# Patient Record
Sex: FEMALE | Race: WHITE | NOT HISPANIC OR LATINO
[De-identification: names, ages, dates, MRNs, and addresses within clinical notes are randomized per-mention and may not be internally consistent; named-entity substitution may affect disease eponyms.]

---

## 2017-01-02 ENCOUNTER — HOSPITAL (OUTPATIENT)
Dept: OTHER | Age: 57
End: 2017-01-02
Attending: EMERGENCY MEDICINE

## 2017-02-10 PROCEDURE — 87269 GIARDIA AG IF: CPT | Performed by: INTERNAL MEDICINE

## 2017-02-10 PROCEDURE — 87045 FECES CULTURE AEROBIC BACT: CPT | Performed by: INTERNAL MEDICINE

## 2017-02-10 PROCEDURE — 87046 STOOL CULTR AEROBIC BACT EA: CPT | Performed by: INTERNAL MEDICINE

## 2017-02-11 PROCEDURE — 89055 LEUKOCYTE ASSESSMENT FECAL: CPT | Performed by: INTERNAL MEDICINE

## 2017-02-11 PROCEDURE — 87015 SPECIMEN INFECT AGNT CONCNTJ: CPT | Performed by: INTERNAL MEDICINE

## 2017-02-11 PROCEDURE — 36415 COLL VENOUS BLD VENIPUNCTURE: CPT | Performed by: INTERNAL MEDICINE

## 2017-02-11 PROCEDURE — 87207 SMEAR SPECIAL STAIN: CPT | Performed by: INTERNAL MEDICINE

## 2017-02-11 PROCEDURE — 87493 C DIFF AMPLIFIED PROBE: CPT | Performed by: INTERNAL MEDICINE

## 2017-02-11 PROCEDURE — 82705 FATS/LIPIDS FECES QUAL: CPT | Performed by: INTERNAL MEDICINE

## 2017-02-15 PROBLEM — Z79.899 ENCOUNTER FOR LONG-TERM (CURRENT) DRUG USE: Status: ACTIVE | Noted: 2017-02-15

## 2017-02-28 PROBLEM — R06.02 SOB (SHORTNESS OF BREATH): Status: ACTIVE | Noted: 2017-02-28

## 2017-02-28 PROBLEM — J45.21 MILD INTERMITTENT ASTHMA WITH ACUTE EXACERBATION: Status: ACTIVE | Noted: 2017-02-28

## 2017-03-01 PROCEDURE — 36415 COLL VENOUS BLD VENIPUNCTURE: CPT | Performed by: INTERNAL MEDICINE

## 2017-03-01 PROCEDURE — 82784 ASSAY IGA/IGD/IGG/IGM EACH: CPT | Performed by: INTERNAL MEDICINE

## 2017-03-01 PROCEDURE — 83516 IMMUNOASSAY NONANTIBODY: CPT | Performed by: INTERNAL MEDICINE

## 2017-03-03 PROCEDURE — 88305 TISSUE EXAM BY PATHOLOGIST: CPT | Performed by: INTERNAL MEDICINE

## 2017-05-17 PROBLEM — Z79.899 ENCOUNTER FOR LONG-TERM (CURRENT) DRUG USE: Status: ACTIVE | Noted: 2017-05-17

## 2017-05-17 PROCEDURE — 86038 ANTINUCLEAR ANTIBODIES: CPT | Performed by: INTERNAL MEDICINE

## 2017-05-17 PROCEDURE — 86480 TB TEST CELL IMMUN MEASURE: CPT | Performed by: INTERNAL MEDICINE

## 2018-03-23 PROBLEM — E55.9 VITAMIN D DEFICIENCY: Status: ACTIVE | Noted: 2018-03-23

## 2018-03-23 PROCEDURE — 86480 TB TEST CELL IMMUN MEASURE: CPT | Performed by: INTERNAL MEDICINE

## 2019-02-14 PROCEDURE — 86480 TB TEST CELL IMMUN MEASURE: CPT | Performed by: INTERNAL MEDICINE

## 2019-10-21 ENCOUNTER — HOSPITAL (OUTPATIENT)
Dept: OTHER | Age: 59
End: 2019-10-21
Attending: INTERNAL MEDICINE

## 2019-11-19 ENCOUNTER — OFFICE VISIT (OUTPATIENT)
Dept: SURGERY | Facility: CLINIC | Age: 59
End: 2019-11-19
Payer: COMMERCIAL

## 2019-11-19 VITALS
DIASTOLIC BLOOD PRESSURE: 72 MMHG | SYSTOLIC BLOOD PRESSURE: 126 MMHG | HEIGHT: 58 IN | BODY MASS INDEX: 39.04 KG/M2 | WEIGHT: 186 LBS | HEART RATE: 76 BPM

## 2019-11-19 DIAGNOSIS — M47.816 LUMBAR SPONDYLOSIS: ICD-10-CM

## 2019-11-19 DIAGNOSIS — M48.02 CERVICAL STENOSIS OF SPINAL CANAL: ICD-10-CM

## 2019-11-19 DIAGNOSIS — R41.89 COGNITIVE CHANGE: ICD-10-CM

## 2019-11-19 DIAGNOSIS — R26.9 GAIT ABNORMALITY: ICD-10-CM

## 2019-11-19 DIAGNOSIS — R42 VERTIGO: ICD-10-CM

## 2019-11-19 DIAGNOSIS — R42 DIZZINESS: ICD-10-CM

## 2019-11-19 DIAGNOSIS — R29.898 WEAKNESS OF BOTH LEGS: ICD-10-CM

## 2019-11-19 DIAGNOSIS — G93.5 CHIARI I MALFORMATION (HCC): Primary | ICD-10-CM

## 2019-11-19 PROCEDURE — 99204 OFFICE O/P NEW MOD 45 MIN: CPT | Performed by: PHYSICIAN ASSISTANT

## 2019-11-19 RX ORDER — BUTALBITAL, ACETAMINOPHEN AND CAFFEINE 50; 325; 40 MG/1; MG/1; MG/1
1 TABLET ORAL EVERY 4 HOURS PRN
Qty: 30 TABLET | Refills: 0 | Status: SHIPPED | OUTPATIENT
Start: 2019-11-19 | End: 2020-06-11 | Stop reason: ALTCHOICE

## 2019-11-19 RX ORDER — PREDNISONE 10 MG/1
10 TABLET ORAL DAILY
Qty: 30 TABLET | Refills: 0 | Status: SHIPPED | OUTPATIENT
Start: 2019-11-19 | End: 2020-03-06 | Stop reason: ALTCHOICE

## 2019-11-19 RX ORDER — DIAZEPAM 10 MG/1
10 TABLET ORAL 3 TIMES DAILY PRN
Qty: 3 TABLET | Refills: 0 | Status: SHIPPED | OUTPATIENT
Start: 2019-11-19 | End: 2020-01-02

## 2019-11-19 NOTE — PROGRESS NOTES
Neurosurgery Consultation      REASON FOR CONSULT: Chiari malformation    HISTORY OF PRESENT Freya Valdivia is a 60year old RH female here for neurosurgical consultation for recently diagnosed Chiari malformation.   States in April and May of t lesser toe 9/9/2014   • Vitamin D deficiency        PAST SURGICAL HISTORY:  Past Surgical History:   Procedure Laterality Date   • COLONOSCOPY, POSSIBLE BIOPSY, POSSIBLE POLYPECTOMY 14272 N/A 3/3/2017    Performed by Boogie Dillon MD at [address] [place] [address] Aero Soln, Inhale 2 puffs into the lungs every 6 (six) hours as needed for Wheezing., Disp: 1 Inhaler, Rfl: 0  Lifitegrast (Marcus MCLEAN), Apply to eye., Disp: , Rfl:     No current facility-administered medications on file prior to visit.        REVIEW OF SYS Chiari malformation with 13 mm tonsillar extension. She has cervical spondylosis at C4-5 C5-6 and C6-7    ASSESSMENT:  1. Chiari malformation  2. Cervical stenosis C4-5 C5-6 C6-7  3. Cognitive changes  4. Gait dysfunction  5.   Rheumatoid arthritis

## 2019-11-19 NOTE — PROGRESS NOTES
Location of Pain:  Pt states that she has issues with cervical pain and head pain. Pt states that she has notice when symptome's occur more when she is laying down flat and issues with yelling or bending over.      Date Pain Began:   April 2019       Work R

## 2019-11-19 NOTE — PATIENT INSTRUCTIONS
Refill policies:    • Allow 2-3 business days for refills; controlled substances may take longer.   • Contact your pharmacy at least 5 days prior to running out of medication and have them send an electronic request or submit request through the “request re Depending on your insurance carrier, approval may take 3-10 days. It is highly recommended patients contact their insurance carrier directly to determine coverage.   If test is done without insurance authorization, patient may be responsible for the entire headaches, prednisone taper if she has an acute episode  3. Imaging: MRI the brain, MRI of the thoracic spine, MRI of the lumbar spine  4.  Activity: As tolerated

## 2019-11-22 ENCOUNTER — TELEPHONE (OUTPATIENT)
Dept: PAIN CLINIC | Facility: CLINIC | Age: 59
End: 2019-11-22

## 2019-11-22 NOTE — TELEPHONE ENCOUNTER
LVMTCB  (No identifier)    Need more information regarding ;letter for work with restrictions.   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    What exactly does she need the letter to state

## 2019-11-22 NOTE — TELEPHONE ENCOUNTER
Pt requesting letter for employer giving her restrictions, pt states she's tired and foggy; pt requesting letter be sent to [employer]

## 2019-11-22 NOTE — TELEPHONE ENCOUNTER
Patient called back with detailed information regarding work restrictions letter. Patient stated:    She has days when she has dizziness at work, pain during her work day, and shaking hands.   Patient states fatigue and exhaustion follow these symptoms, an

## 2019-11-25 NOTE — TELEPHONE ENCOUNTER
Sent my chart message:  \"I cannot provide you with a letter stating that you can only work the hours you feel you can work. I can fill out FMLA for intermittent leave if you need to take time off. I can write you letter restricting your work.  Let me know\

## 2019-12-03 ENCOUNTER — PATIENT MESSAGE (OUTPATIENT)
Dept: SURGERY | Facility: CLINIC | Age: 59
End: 2019-12-03

## 2019-12-03 ENCOUNTER — TELEPHONE (OUTPATIENT)
Dept: SURGERY | Facility: CLINIC | Age: 59
End: 2019-12-03

## 2019-12-03 NOTE — TELEPHONE ENCOUNTER
Pt calling requesting call back @ her work # [phone]. Pt states she wants to discuss the letter, \"because it would be really nice to stay employed so I can pay for this surgery\".

## 2019-12-03 NOTE — TELEPHONE ENCOUNTER
Called patient at her job as she requested. She began to raise her voice and speaking over me regarding the [employer] message sent back to her regarding her [place].   She stated she will lose her job if the paperwork is not filled out, she works for

## 2019-12-03 NOTE — TELEPHONE ENCOUNTER
ALVIN Velasquez paperwork was completed. She was given intermittent leave with frequency of 1 time a week and may last 1 or 2 days per episode. Please return to [facility] and a copy to the patient.     Please scan a copy of the document

## 2019-12-03 NOTE — TELEPHONE ENCOUNTER
Patient requested letter in November that she could take time off of work when she needs to with no specific dates. This was denied and the patient was informed.     [place] paperwork was received but will not be completed until her follow-up appointment moustapha encarnacion

## 2019-12-03 NOTE — TELEPHONE ENCOUNTER
[facility] [place] forms to be completed. Endorsed to Wade.

## 2019-12-03 NOTE — TELEPHONE ENCOUNTER
From: Shalonda King  To: Geoff Ramos  Sent: 12/3/2019 1:45 PM CST  Subject: Sathya Galvin,    We received [facility] paperwork today and this was reviewed with ALLY Chong who you saw in the office on 11/19/19.      At that office visit in

## 2019-12-03 NOTE — TELEPHONE ENCOUNTER
Patient was seen once on 11/19/19. No mention of being taken off work. Paperwork forwarded to ALLY Kamara to advise on paperwork.

## 2019-12-04 NOTE — TELEPHONE ENCOUNTER
[facility] paperwork faxed to [address] at fax number:    [phone]    Forms copied and mailed to patient's home. Scanning sheet printed and paperwork sent to front folder for scanning.

## 2019-12-09 NOTE — TELEPHONE ENCOUNTER
Pt called stating employer needs \"numeric response for frequency & duration; number of hours and numbers of days \" on question #7; pt aware form en route to scanning dept; pt will re-fax form to MICHELLE, pt willing to pay additional charge if needed

## 2019-12-10 NOTE — TELEPHONE ENCOUNTER
Received FMLA forms from [address] [place] [address]  \"FMLA forms partially completed. \" Endorsed to provider

## 2019-12-10 NOTE — TELEPHONE ENCOUNTER
Veronica Caller forms re-received, endorsed to provider for review of question #7; please advise if pt should be charged for this in addition to original form

## 2019-12-10 NOTE — TELEPHONE ENCOUNTER
Pt calling, states she got the original in mail, she will refax that one back to us since we can't see the scanning one yet. She states that not only does question #7 need to be changed but page 4 is a signature page & it needs to be signed.  Please call pa

## 2019-12-12 NOTE — TELEPHONE ENCOUNTER
[place] paperwork was reviewed again.     Item #7 additional information was provided including her next appointment date and initialed    Form was signed for resubmission

## 2019-12-12 NOTE — TELEPHONE ENCOUNTER
Revised documents (revised per ALLY Chong) sent to scanning and faxed to LEOBARDO [facility] at fax number: [phone]    Patient messaged and informed of paperwork transmission.       Newly revised disability forms sent

## 2020-01-02 ENCOUNTER — OFFICE VISIT (OUTPATIENT)
Dept: SURGERY | Facility: CLINIC | Age: 60
End: 2020-01-02
Payer: COMMERCIAL

## 2020-01-02 ENCOUNTER — HOSPITAL ENCOUNTER (OUTPATIENT)
Dept: GENERAL RADIOLOGY | Facility: HOSPITAL | Age: 60
Discharge: HOME | End: 2020-01-02
Attending: PHYSICIAN ASSISTANT
Payer: COMMERCIAL

## 2020-01-02 VITALS — DIASTOLIC BLOOD PRESSURE: 76 MMHG | SYSTOLIC BLOOD PRESSURE: 130 MMHG | HEART RATE: 76 BPM

## 2020-01-02 DIAGNOSIS — G93.5 CHIARI I MALFORMATION (HCC): Primary | ICD-10-CM

## 2020-01-02 DIAGNOSIS — R26.9 GAIT ABNORMALITY: ICD-10-CM

## 2020-01-02 DIAGNOSIS — R42 DIZZINESS: ICD-10-CM

## 2020-01-02 DIAGNOSIS — R41.89 COGNITIVE CHANGE: ICD-10-CM

## 2020-01-02 DIAGNOSIS — M48.02 CERVICAL STENOSIS OF SPINAL CANAL: ICD-10-CM

## 2020-01-02 DIAGNOSIS — R42 VERTIGO: ICD-10-CM

## 2020-01-02 DIAGNOSIS — M47.816 LUMBAR SPONDYLOSIS: ICD-10-CM

## 2020-01-02 DIAGNOSIS — R29.898 WEAKNESS OF BOTH LEGS: ICD-10-CM

## 2020-01-02 PROCEDURE — 72052 X-RAY EXAM NECK SPINE 6/>VWS: CPT | Performed by: PHYSICIAN ASSISTANT

## 2020-01-02 PROCEDURE — 99214 OFFICE O/P EST MOD 30 MIN: CPT | Performed by: PHYSICIAN ASSISTANT

## 2020-01-02 RX ORDER — DIAZEPAM 10 MG/1
10 TABLET ORAL ONCE
Qty: 1 TABLET | Refills: 0 | Status: SHIPPED | OUTPATIENT
Start: 2020-01-02 | End: 2020-01-02

## 2020-01-02 NOTE — PROGRESS NOTES
Neurosurgery  Follow up      [address] is a 60year old RH female here in follow up after imaging. C/O posterior head pressure lying down. Choking. C/O SOB and tightness.  Bent over in April to  dog lease and sy She denies any night sweats night pain.     PAST MEDICAL HISTORY:  Past Medical History:   Diagnosis Date   • Chronic rhinitis    • Collagenous colitis    • Extrinsic asthma, unspecified    • GERD (gastroesophageal reflux disease)    • Low back pain    • Pl 3 (three) times daily as needed. Before MRIs, Disp: 3 tablet, Rfl: 0  predniSONE 10 MG Oral Tab, Take 1 tablet (10 mg total) by mouth daily.  40 mg per day x 4 days, 30 mg per day x 3 days, 20 mg per day x 2 days, 10 mg x 1 day, Disp: 30 tablet, Rfl: 0  But intact. No clonus. Patient can heel and toe walk. Negative Spurling's. negative Regalado's. Nearly positive Romberg. With bilateral straight leg raising she gets pressure in her neck. Coughing and sneezing gives her increased pain. No scoliosis.   No de arthritis  6. L4-5 grade I listhesis and L3-4-5 spinal stenosis    PLAN:  1. Follow up after imaging  2. Medication: valium ordered for MRI  3. Imaging: MRI brain w/wo r/o underlying lesion vs isolated Chiari, XR CS r/o C1-2 instability  4.  Activity: As to

## 2020-01-02 NOTE — PROGRESS NOTES
Pt is here for follow up to review imaging     Imaging:     MRI of the thoracic     MRI of the lumbar     MRI of the brain. Pt states that she has been issues with numbness and tingling in the bilateral arm.  Pt states that she has issues with weakness

## 2020-01-02 NOTE — PATIENT INSTRUCTIONS
Refill policies:    • Allow 2-3 business days for refills; controlled substances may take longer.   • Contact your pharmacy at least 5 days prior to running out of medication and have them send an electronic request or submit request through the “request re Depending on your insurance carrier, approval may take 3-10 days. It is highly recommended patients contact their insurance carrier directly to determine coverage.   If test is done without insurance authorization, patient may be responsible for the entire underlying lesion vs isolated Chiari, XR CS r/o C1-2 instability  4. Activity: As tolerated  5. Consider Chiari decompression if MRI brain 3T w/wo negative for additional pathology  6.  She can call to schedule surgery

## 2020-01-13 ENCOUNTER — HOSPITAL ENCOUNTER (OUTPATIENT)
Dept: MRI IMAGING | Facility: HOSPITAL | Age: 60
Discharge: HOME | End: 2020-01-13
Attending: PHYSICIAN ASSISTANT
Payer: COMMERCIAL

## 2020-01-13 DIAGNOSIS — R42 DIZZINESS: ICD-10-CM

## 2020-01-13 DIAGNOSIS — R29.898 WEAKNESS OF BOTH LEGS: ICD-10-CM

## 2020-01-13 DIAGNOSIS — G93.5 CHIARI I MALFORMATION (HCC): ICD-10-CM

## 2020-01-13 DIAGNOSIS — R26.9 GAIT ABNORMALITY: ICD-10-CM

## 2020-01-13 DIAGNOSIS — R41.89 COGNITIVE CHANGE: ICD-10-CM

## 2020-01-13 DIAGNOSIS — R42 VERTIGO: ICD-10-CM

## 2020-01-13 PROCEDURE — 70553 MRI BRAIN STEM W/O & W/DYE: CPT | Performed by: PHYSICIAN ASSISTANT

## 2020-01-13 PROCEDURE — A9575 INJ GADOTERATE MEGLUMI 0.1ML: HCPCS | Performed by: PHYSICIAN ASSISTANT

## 2020-01-16 ENCOUNTER — PATIENT MESSAGE (OUTPATIENT)
Dept: SURGERY | Facility: CLINIC | Age: 60
End: 2020-01-16

## 2020-01-17 ENCOUNTER — TELEPHONE (OUTPATIENT)
Dept: SURGERY | Facility: CLINIC | Age: 60
End: 2020-01-17

## 2020-01-17 DIAGNOSIS — R42 DIZZINESS: ICD-10-CM

## 2020-01-17 DIAGNOSIS — M47.816 LUMBAR SPONDYLOSIS: ICD-10-CM

## 2020-01-17 DIAGNOSIS — R42 VERTIGO: ICD-10-CM

## 2020-01-17 DIAGNOSIS — R41.89 COGNITIVE CHANGE: ICD-10-CM

## 2020-01-17 DIAGNOSIS — R29.898 WEAKNESS OF BOTH LEGS: ICD-10-CM

## 2020-01-17 DIAGNOSIS — R26.9 GAIT ABNORMALITY: ICD-10-CM

## 2020-01-17 DIAGNOSIS — M48.02 CERVICAL STENOSIS OF SPINAL CANAL: ICD-10-CM

## 2020-01-17 DIAGNOSIS — G93.5 CHIARI I MALFORMATION (HCC): Primary | ICD-10-CM

## 2020-01-17 NOTE — TELEPHONE ENCOUNTER
From: Renea Omer  To: Trent Lu MD  Sent: 1/16/2020 8:37 PM CST  Subject: Test Results Question    Dr. Micky Hudson,    The MRI you ordered was conducted on the 13th.  I had set a follow-up appt before realizing you said I would not need to come in

## 2020-01-17 NOTE — TELEPHONE ENCOUNTER
Per Radiology:    Dictated by: Beto Buckley MD on 1/13/2020 at 15:23  Impression:         \"CONCLUSION:       1.  Chiari 1 malformation is noted. Milta Child is 8 mm herniation of the cerebellar tonsil below the foramen magnum.  There is beaking of the cer

## 2020-01-17 NOTE — TELEPHONE ENCOUNTER
X-rays of the cervical spine were negative for instability at C1-2. Repeat MRI of the brain on a high-field magnet with contrast did not show any underlying malignancy or lesions.   Orders were placed for a Chiari decompression with Dr. Joseph Raya

## 2020-01-20 NOTE — TELEPHONE ENCOUNTER
Please call patient to schedule two post op appointments with Dr. Evi Montes for 2 and 6 weeks. Surgery scheduled for 03/18/20.   Thank you

## 2020-01-20 NOTE — TELEPHONE ENCOUNTER
You are scheduled for Chiari Decompression surgery on 03/18/20 with Kalli Mcintyre    Pre-op instructions discussed with patient and surgical packet provided:    · You will need to contact the Pre-admission department at [phone].  You will speak with a nurse after this type of surgery due to cutting of muscles and reattachment. Patients tend to experience a lot of muscle spasm pain and your neck movement is very restricted post surgery. This may last a couple of days to a couple of  weeks.  You will be prescrib

## 2020-02-20 PROBLEM — J45.21 MILD INTERMITTENT ASTHMA WITH ACUTE EXACERBATION: Status: RESOLVED | Noted: 2017-02-28 | Resolved: 2020-02-20

## 2020-02-24 NOTE — TELEPHONE ENCOUNTER
PCP recommending cardiology evaluation prior to clearance. Cardiology appointment scheduled for 2/27. Breath sounds clear and equal bilaterally.

## 2020-02-25 NOTE — TELEPHONE ENCOUNTER
Prior authorization request completed for: CRANIECTOMY FOR CHIARI MALFORMATION DECOMPRESSION.  DUROPLASTY WITH GRAFTING   Authorization #[id number]  Authorization dates: 03/18/2020-03/20/2020  CPT codes approved: 01824, 50980  Physician: Dr. Yunior Salazar

## 2020-02-26 NOTE — TELEPHONE ENCOUNTER
Patient has seen PCP 2/20 with notes:     \"Rheumatoid arthritis involving multiple sites with positive rheumatoid factor (hcc): stable     Advise pt to f/u with cardiology for cardiac risk stratification.       Orders Placed This Encounter      MATTHEW Giang

## 2020-03-02 NOTE — TELEPHONE ENCOUNTER
Patient has been cleared by cardiology: \"1) No cardiac contraindications to proceeding with her neurosurgical procedure. 2) She will work on increasing physical activity and weight loss after the procedure.  \"

## 2020-03-05 ENCOUNTER — TELEPHONE (OUTPATIENT)
Dept: SURGERY | Facility: CLINIC | Age: 60
End: 2020-03-05

## 2020-03-06 RX ORDER — GARLIC EXTRACT 500 MG
1 CAPSULE ORAL DAILY
COMMUNITY
End: 2020-06-11 | Stop reason: ALTCHOICE

## 2020-03-06 RX ORDER — MELATONIN
1000 DAILY
COMMUNITY
End: 2020-06-11 | Stop reason: ALTCHOICE

## 2020-03-11 NOTE — TELEPHONE ENCOUNTER
Spoke to Miko Casanova at [facility], inpatient surgical authorization has been updated to reflect new date of service:    Prior authorization request completed for: CRANIECTOMY FOR CHIARI MALFORMATION DECOMPRESSION.  DUROPLASTY WITH GRAFTING   Authorization #[id number]

## 2020-03-11 NOTE — TELEPHONE ENCOUNTER
Called patient on behalf of Dr. Jayjay Whitehead to move surgery date to 03/16 at 12:00. Patient accepted.

## 2020-03-16 ENCOUNTER — HOSPITAL ENCOUNTER (INPATIENT)
Facility: HOSPITAL | Age: 60
LOS: 3 days | Discharge: NURSING, REHAB OR LONG-TERM CARE | DRG: 026 | End: 2020-03-19
Attending: NEUROLOGICAL SURGERY | Admitting: NEUROLOGICAL SURGERY
Payer: COMMERCIAL

## 2020-03-16 ENCOUNTER — ANESTHESIA EVENT (OUTPATIENT)
Dept: SURGERY | Facility: HOSPITAL | Age: 60
DRG: 026 | End: 2020-03-16
Payer: COMMERCIAL

## 2020-03-16 ENCOUNTER — ANESTHESIA (OUTPATIENT)
Dept: SURGERY | Facility: HOSPITAL | Age: 60
DRG: 026 | End: 2020-03-16
Payer: COMMERCIAL

## 2020-03-16 ENCOUNTER — APPOINTMENT (OUTPATIENT)
Dept: CT IMAGING | Facility: HOSPITAL | Age: 60
DRG: 026 | End: 2020-03-16
Attending: PHYSICIAN ASSISTANT
Payer: COMMERCIAL

## 2020-03-16 DIAGNOSIS — R41.89 COGNITIVE CHANGE: ICD-10-CM

## 2020-03-16 DIAGNOSIS — M47.816 LUMBAR SPONDYLOSIS: ICD-10-CM

## 2020-03-16 DIAGNOSIS — G93.5 CHIARI I MALFORMATION (HCC): ICD-10-CM

## 2020-03-16 DIAGNOSIS — M48.02 CERVICAL STENOSIS OF SPINAL CANAL: ICD-10-CM

## 2020-03-16 DIAGNOSIS — R26.9 GAIT ABNORMALITY: ICD-10-CM

## 2020-03-16 DIAGNOSIS — R29.898 WEAKNESS OF BOTH LEGS: ICD-10-CM

## 2020-03-16 DIAGNOSIS — R42 VERTIGO: ICD-10-CM

## 2020-03-16 DIAGNOSIS — R42 DIZZINESS: ICD-10-CM

## 2020-03-16 PROBLEM — Z01.818 PRE-OP TESTING: Status: ACTIVE | Noted: 2020-03-16

## 2020-03-16 LAB
ANTIBODY SCREEN: NEGATIVE
DEPRECATED RDW RBC AUTO: 44.3 FL (ref 35.1–46.3)
ERYTHROCYTE [DISTWIDTH] IN BLOOD BY AUTOMATED COUNT: 13.5 % (ref 11–15)
HCT VFR BLD AUTO: 41.7 % (ref 35–48)
HGB BLD-MCNC: 13.1 G/DL (ref 12–16)
MCH RBC QN AUTO: 28.5 PG (ref 26–34)
MCHC RBC AUTO-ENTMCNC: 31.4 G/DL (ref 31–37)
MCV RBC AUTO: 90.7 FL (ref 80–100)
PLATELET # BLD AUTO: 331 10(3)UL (ref 150–450)
RBC # BLD AUTO: 4.6 X10(6)UL (ref 3.8–5.3)
RH BLOOD TYPE: POSITIVE
WBC # BLD AUTO: 14.2 X10(3) UL (ref 4–11)

## 2020-03-16 PROCEDURE — 00NC0ZZ RELEASE CEREBELLUM, OPEN APPROACH: ICD-10-PCS | Performed by: NEUROLOGICAL SURGERY

## 2020-03-16 PROCEDURE — 0NB70ZZ EXCISION OF OCCIPITAL BONE, OPEN APPROACH: ICD-10-PCS | Performed by: NEUROLOGICAL SURGERY

## 2020-03-16 PROCEDURE — 70450 CT HEAD/BRAIN W/O DYE: CPT | Performed by: PHYSICIAN ASSISTANT

## 2020-03-16 PROCEDURE — 00U20KZ SUPPLEMENT DURA MATER WITH NONAUTOLOGOUS TISSUE SUBSTITUTE, OPEN APPROACH: ICD-10-PCS | Performed by: NEUROLOGICAL SURGERY

## 2020-03-16 DEVICE — DURAL SEALANT: Type: IMPLANTABLE DEVICE | Site: HEAD | Status: FUNCTIONAL

## 2020-03-16 DEVICE — DURAMATRIX 2X2 SUTURABLE: Type: IMPLANTABLE DEVICE | Site: HEAD | Status: FUNCTIONAL

## 2020-03-16 RX ORDER — NEOSTIGMINE METHYLSULFATE 1 MG/ML
INJECTION INTRAVENOUS AS NEEDED
Status: DISCONTINUED | OUTPATIENT
Start: 2020-03-16 | End: 2020-03-16 | Stop reason: SURG

## 2020-03-16 RX ORDER — SODIUM PHOSPHATE, DIBASIC AND SODIUM PHOSPHATE, MONOBASIC 7; 19 G/133ML; G/133ML
1 ENEMA RECTAL ONCE AS NEEDED
Status: DISCONTINUED | OUTPATIENT
Start: 2020-03-16 | End: 2020-03-19

## 2020-03-16 RX ORDER — ONDANSETRON 2 MG/ML
4 INJECTION INTRAMUSCULAR; INTRAVENOUS EVERY 4 HOURS PRN
Status: DISPENSED | OUTPATIENT
Start: 2020-03-16 | End: 2020-03-17

## 2020-03-16 RX ORDER — SODIUM CHLORIDE 9 MG/ML
INJECTION, SOLUTION INTRAVENOUS CONTINUOUS PRN
Status: DISCONTINUED | OUTPATIENT
Start: 2020-03-16 | End: 2020-03-16 | Stop reason: SURG

## 2020-03-16 RX ORDER — BISACODYL 10 MG
10 SUPPOSITORY, RECTAL RECTAL
Status: DISCONTINUED | OUTPATIENT
Start: 2020-03-16 | End: 2020-03-19

## 2020-03-16 RX ORDER — DIPHENHYDRAMINE HCL 25 MG
25 CAPSULE ORAL EVERY 4 HOURS PRN
Status: DISCONTINUED | OUTPATIENT
Start: 2020-03-16 | End: 2020-03-19

## 2020-03-16 RX ORDER — ACETAMINOPHEN 325 MG/1
650 TABLET ORAL EVERY 4 HOURS PRN
Status: DISCONTINUED | OUTPATIENT
Start: 2020-03-16 | End: 2020-03-19

## 2020-03-16 RX ORDER — DIPHENHYDRAMINE HYDROCHLORIDE 50 MG/ML
25 INJECTION INTRAMUSCULAR; INTRAVENOUS EVERY 4 HOURS PRN
Status: DISCONTINUED | OUTPATIENT
Start: 2020-03-16 | End: 2020-03-19

## 2020-03-16 RX ORDER — GLYCOPYRROLATE 0.2 MG/ML
INJECTION, SOLUTION INTRAMUSCULAR; INTRAVENOUS AS NEEDED
Status: DISCONTINUED | OUTPATIENT
Start: 2020-03-16 | End: 2020-03-16 | Stop reason: SURG

## 2020-03-16 RX ORDER — METOCLOPRAMIDE HYDROCHLORIDE 5 MG/ML
INJECTION INTRAMUSCULAR; INTRAVENOUS AS NEEDED
Status: DISCONTINUED | OUTPATIENT
Start: 2020-03-16 | End: 2020-03-16 | Stop reason: SURG

## 2020-03-16 RX ORDER — METOCLOPRAMIDE HYDROCHLORIDE 5 MG/ML
10 INJECTION INTRAMUSCULAR; INTRAVENOUS EVERY 6 HOURS PRN
Status: DISCONTINUED | OUTPATIENT
Start: 2020-03-16 | End: 2020-03-19

## 2020-03-16 RX ORDER — DIAZEPAM 5 MG/1
5 TABLET ORAL EVERY 6 HOURS PRN
Status: DISCONTINUED | OUTPATIENT
Start: 2020-03-16 | End: 2020-03-19

## 2020-03-16 RX ORDER — DOCUSATE SODIUM 100 MG/1
100 CAPSULE, LIQUID FILLED ORAL 2 TIMES DAILY
Status: DISCONTINUED | OUTPATIENT
Start: 2020-03-16 | End: 2020-03-19

## 2020-03-16 RX ORDER — HYDROCODONE BITARTRATE AND ACETAMINOPHEN 10; 325 MG/1; MG/1
1 TABLET ORAL EVERY 4 HOURS PRN
Status: DISCONTINUED | OUTPATIENT
Start: 2020-03-16 | End: 2020-03-18

## 2020-03-16 RX ORDER — ROCURONIUM BROMIDE 10 MG/ML
INJECTION, SOLUTION INTRAVENOUS AS NEEDED
Status: DISCONTINUED | OUTPATIENT
Start: 2020-03-16 | End: 2020-03-16 | Stop reason: SURG

## 2020-03-16 RX ORDER — BACITRACIN 50000 [USP'U]/1
INJECTION, POWDER, LYOPHILIZED, FOR SOLUTION INTRAMUSCULAR AS NEEDED
Status: DISCONTINUED | OUTPATIENT
Start: 2020-03-16 | End: 2020-03-16 | Stop reason: HOSPADM

## 2020-03-16 RX ORDER — POLYETHYLENE GLYCOL 3350 17 G/17G
17 POWDER, FOR SOLUTION ORAL DAILY PRN
Status: DISCONTINUED | OUTPATIENT
Start: 2020-03-16 | End: 2020-03-19

## 2020-03-16 RX ORDER — SODIUM CHLORIDE 9 MG/ML
INJECTION, SOLUTION INTRAVENOUS CONTINUOUS
Status: DISCONTINUED | OUTPATIENT
Start: 2020-03-16 | End: 2020-03-16 | Stop reason: HOSPADM

## 2020-03-16 RX ORDER — ORPHENADRINE CITRATE 30 MG/ML
30 INJECTION INTRAMUSCULAR; INTRAVENOUS EVERY 6 HOURS
Status: DISCONTINUED | OUTPATIENT
Start: 2020-03-16 | End: 2020-03-18

## 2020-03-16 RX ORDER — HYDROMORPHONE HYDROCHLORIDE 1 MG/ML
0.2 INJECTION, SOLUTION INTRAMUSCULAR; INTRAVENOUS; SUBCUTANEOUS EVERY 2 HOUR PRN
Status: DISCONTINUED | OUTPATIENT
Start: 2020-03-16 | End: 2020-03-19

## 2020-03-16 RX ORDER — VANCOMYCIN HYDROCHLORIDE
15 ONCE
Status: COMPLETED | OUTPATIENT
Start: 2020-03-17 | End: 2020-03-17

## 2020-03-16 RX ORDER — HYDROMORPHONE HYDROCHLORIDE 1 MG/ML
0.8 INJECTION, SOLUTION INTRAMUSCULAR; INTRAVENOUS; SUBCUTANEOUS EVERY 2 HOUR PRN
Status: DISCONTINUED | OUTPATIENT
Start: 2020-03-16 | End: 2020-03-19

## 2020-03-16 RX ORDER — KETOROLAC TROMETHAMINE 30 MG/ML
INJECTION, SOLUTION INTRAMUSCULAR; INTRAVENOUS AS NEEDED
Status: DISCONTINUED | OUTPATIENT
Start: 2020-03-16 | End: 2020-03-16 | Stop reason: SURG

## 2020-03-16 RX ORDER — ONDANSETRON 2 MG/ML
INJECTION INTRAMUSCULAR; INTRAVENOUS AS NEEDED
Status: DISCONTINUED | OUTPATIENT
Start: 2020-03-16 | End: 2020-03-16 | Stop reason: SURG

## 2020-03-16 RX ORDER — SODIUM CHLORIDE 9 MG/ML
INJECTION, SOLUTION INTRAVENOUS CONTINUOUS
Status: DISCONTINUED | OUTPATIENT
Start: 2020-03-16 | End: 2020-03-19

## 2020-03-16 RX ORDER — ONDANSETRON 2 MG/ML
4 INJECTION INTRAMUSCULAR; INTRAVENOUS AS NEEDED
Status: DISPENSED | OUTPATIENT
Start: 2020-03-16 | End: 2020-03-17

## 2020-03-16 RX ORDER — HYDROMORPHONE HYDROCHLORIDE 1 MG/ML
0.4 INJECTION, SOLUTION INTRAMUSCULAR; INTRAVENOUS; SUBCUTANEOUS EVERY 5 MIN PRN
Status: DISPENSED | OUTPATIENT
Start: 2020-03-16 | End: 2020-03-17

## 2020-03-16 RX ORDER — VANCOMYCIN HYDROCHLORIDE
15 ONCE
Status: COMPLETED | OUTPATIENT
Start: 2020-03-16 | End: 2020-03-16

## 2020-03-16 RX ORDER — SENNOSIDES 8.6 MG
17.2 TABLET ORAL NIGHTLY
Status: DISCONTINUED | OUTPATIENT
Start: 2020-03-16 | End: 2020-03-19

## 2020-03-16 RX ORDER — NALOXONE HYDROCHLORIDE 0.4 MG/ML
80 INJECTION, SOLUTION INTRAMUSCULAR; INTRAVENOUS; SUBCUTANEOUS AS NEEDED
Status: ACTIVE | OUTPATIENT
Start: 2020-03-16 | End: 2020-03-17

## 2020-03-16 RX ORDER — DEXAMETHASONE SODIUM PHOSPHATE 4 MG/ML
VIAL (ML) INJECTION AS NEEDED
Status: DISCONTINUED | OUTPATIENT
Start: 2020-03-16 | End: 2020-03-16 | Stop reason: SURG

## 2020-03-16 RX ORDER — LIFITEGRAST 50 MG/ML
1 SOLUTION/ DROPS OPHTHALMIC 2 TIMES DAILY
COMMUNITY
Start: 2020-02-17 | End: 2020-06-11 | Stop reason: ALTCHOICE

## 2020-03-16 RX ORDER — HYDROCODONE BITARTRATE AND ACETAMINOPHEN 10; 325 MG/1; MG/1
2 TABLET ORAL EVERY 4 HOURS PRN
Status: DISCONTINUED | OUTPATIENT
Start: 2020-03-16 | End: 2020-03-18

## 2020-03-16 RX ORDER — LIDOCAINE HYDROCHLORIDE 10 MG/ML
INJECTION, SOLUTION EPIDURAL; INFILTRATION; INTRACAUDAL; PERINEURAL AS NEEDED
Status: DISCONTINUED | OUTPATIENT
Start: 2020-03-16 | End: 2020-03-16

## 2020-03-16 RX ORDER — SODIUM CHLORIDE, SODIUM LACTATE, POTASSIUM CHLORIDE, CALCIUM CHLORIDE 600; 310; 30; 20 MG/100ML; MG/100ML; MG/100ML; MG/100ML
INJECTION, SOLUTION INTRAVENOUS CONTINUOUS
Status: DISCONTINUED | OUTPATIENT
Start: 2020-03-16 | End: 2020-03-19

## 2020-03-16 RX ORDER — HYDROMORPHONE HYDROCHLORIDE 1 MG/ML
0.4 INJECTION, SOLUTION INTRAMUSCULAR; INTRAVENOUS; SUBCUTANEOUS EVERY 2 HOUR PRN
Status: DISCONTINUED | OUTPATIENT
Start: 2020-03-16 | End: 2020-03-19

## 2020-03-16 RX ORDER — ACETAMINOPHEN 500 MG
1000 TABLET ORAL ONCE
Status: DISCONTINUED | OUTPATIENT
Start: 2020-03-16 | End: 2020-03-16

## 2020-03-16 RX ADMIN — VANCOMYCIN HYDROCHLORIDE 1.25 G: at 12:55:00

## 2020-03-16 RX ADMIN — SODIUM CHLORIDE: 9 INJECTION, SOLUTION INTRAVENOUS at 12:42:00

## 2020-03-16 RX ADMIN — SODIUM CHLORIDE: 9 INJECTION, SOLUTION INTRAVENOUS at 15:30:00

## 2020-03-16 RX ADMIN — NEOSTIGMINE METHYLSULFATE 4 MG: 1 INJECTION INTRAVENOUS at 17:31:00

## 2020-03-16 RX ADMIN — SODIUM CHLORIDE: 9 INJECTION, SOLUTION INTRAVENOUS at 12:43:00

## 2020-03-16 RX ADMIN — SODIUM CHLORIDE: 9 INJECTION, SOLUTION INTRAVENOUS at 14:24:00

## 2020-03-16 RX ADMIN — ROCURONIUM BROMIDE 10 MG: 10 INJECTION, SOLUTION INTRAVENOUS at 13:30:00

## 2020-03-16 RX ADMIN — ROCURONIUM BROMIDE 10 MG: 10 INJECTION, SOLUTION INTRAVENOUS at 16:00:00

## 2020-03-16 RX ADMIN — SODIUM CHLORIDE: 9 INJECTION, SOLUTION INTRAVENOUS at 16:07:00

## 2020-03-16 RX ADMIN — SODIUM CHLORIDE: 9 INJECTION, SOLUTION INTRAVENOUS at 14:01:00

## 2020-03-16 RX ADMIN — ROCURONIUM BROMIDE 50 MG: 10 INJECTION, SOLUTION INTRAVENOUS at 12:40:00

## 2020-03-16 RX ADMIN — GLYCOPYRROLATE 0.6 MG: 0.2 INJECTION, SOLUTION INTRAMUSCULAR; INTRAVENOUS at 17:31:00

## 2020-03-16 RX ADMIN — DEXAMETHASONE SODIUM PHOSPHATE 4 MG: 4 MG/ML VIAL (ML) INJECTION at 12:55:00

## 2020-03-16 RX ADMIN — SODIUM CHLORIDE: 9 INJECTION, SOLUTION INTRAVENOUS at 12:47:00

## 2020-03-16 RX ADMIN — METOCLOPRAMIDE HYDROCHLORIDE 10 MG: 5 INJECTION INTRAMUSCULAR; INTRAVENOUS at 17:10:00

## 2020-03-16 RX ADMIN — SODIUM CHLORIDE: 9 INJECTION, SOLUTION INTRAVENOUS at 12:37:00

## 2020-03-16 RX ADMIN — ROCURONIUM BROMIDE 10 MG: 10 INJECTION, SOLUTION INTRAVENOUS at 15:00:00

## 2020-03-16 RX ADMIN — KETOROLAC TROMETHAMINE 30 MG: 30 INJECTION, SOLUTION INTRAMUSCULAR; INTRAVENOUS at 17:00:00

## 2020-03-16 RX ADMIN — ONDANSETRON 4 MG: 2 INJECTION INTRAMUSCULAR; INTRAVENOUS at 17:10:00

## 2020-03-16 NOTE — BRIEF OP NOTE
Pre-Operative Diagnosis: Chiari I malformation (HCC) [G93.5]  Gait abnormality [R26.9]  Cognitive change [R41.89]  Weakness of both legs [R29.898]  Vertigo [R42]  Dizziness [R42]  Cervical stenosis of spinal canal [M48.02]  Lumbar spondylosis [B34.359]

## 2020-03-16 NOTE — H&P
ALLY Bazan   Physician Assistant   Physician Assistant   Progress Notes      Signed   Encounter Date:  1/2/2020               Signed             Neurosurgery  Follow up  Cliff is a 60year old RH fem She does have occipital pressure and pain. She complains of dizziness, vertigo, lightheadedness, unsteadiness with walking. She is fallen on 3 occasions. Her occipital headache radiates to her right temple. She has decreased concentration.   She feels h reports that she has never smoked. She has never used smokeless tobacco. She reports current alcohol use.  She reports that she does not use drugs.     ALLERGIES:     Arava [Leflunomide]     DIZZINESS, Tightness in Throat  Bactrim                 Dyspnea Lifitegrast (Alexy Lose OP), Apply to eye., Disp: , Rfl:      No current facility-administered medications on file prior to visit.         REVIEW OF SYSTEMS:  A 10-point system was reviewed.   Pertinent positives and negatives are noted in HPI.       PHYSICAL E MRI Cervical spine 11/4/19 shows Chiari malformation with 13 mm tonsillar extension.   She has cervical spondylosis at C4-5 C5-6 and C6-7     MRI brain 12/9/19  there is a Chiari 1.5 malformation, with peglike cerebellar  tonsils, measuring 13 mm below the The above referenced H&P was reviewed by Ismael Fonseca PA-C on 03/16/20, the patient was examined and no significant changes have occurred in the patient's condition since the H&P was performed.  She states no chest pain, shortness of breath or abdominal

## 2020-03-16 NOTE — CONSULTS
[facility]  Neurocritical Care Consult Note    Geoff Ramos Patient Status:  Surgery Admit - Inpt    1960 MRN [id number]   [facility] SURGERY Attending Karyl Bence, MD   Hosp Day # 0 ALL [place] Disp: , Rfl: , Past Week at Unknown time  hypromellose 0.3 % Ophthalmic Gel, Place 1 Application into both eyes nightly., Disp: , Rfl: , 3/15/2020 at 2000  Abatacept [facility]) 125 MG/ML Subcutaneous Solution Prefilled Syringe, Inject 125 mg into the skin e Highest education level: Not on file    Tobacco Use      Smoking status: Never Smoker      Smokeless tobacco: Never Used    Substance and Sexual Activity      Alcohol use: Not Currently        Alcohol/week: 0.0 - 1.0 standard drinks        Frequency: Month 38.61 kg/m².     Intake/Output:    Intake/Output Summary (Last 24 hours) at 3/16/2020 1411  Last data filed at 3/16/2020 1401  Gross per 24 hour   Intake 600 ml   Output —   Net 600 ml       Physical Examination:   General- No acute distress  CV- RRR  Resp-

## 2020-03-16 NOTE — ANESTHESIA PREPROCEDURE EVALUATION
PRE-OP EVALUATION    Patient Name: Colleen Galvan    Pre-op Diagnosis: Chiari I malformation ([facility] [address] [G93.5]  Gait abnormality [R26.9]  Cognitive change [R41.89]  Weakness of both legs [R29.898]  Vertigo [R42]  Dizziness [R42]  Cervical stenosis of spina Complications  (-) history of anesthetic complications         GI/Hepatic/Renal      (+) GERD                           Cardiovascular      ECG reviewed.   Exercise tolerance: good     MET: >4    (+) obesity                                       Endo/Other Pulmonary    Pulmonary exam normal.                 Other findings            ASA: 3   Plan: general  NPO status verified and patient meets guidelines. Post-procedure pain management plan discussed with surgeon and patient.     Comment: Plan for general

## 2020-03-16 NOTE — ANESTHESIA PROCEDURE NOTES
Peripheral IV  Date/Time: 3/16/2020 12:47 PM  Inserted by: Anibal Hoyos MD    Placement  Needle size: 18 G  Laterality: right  Location: hand  Local anesthetic: none  Site prep: alcohol  Technique: anatomical landmarks  Attempts: 1

## 2020-03-16 NOTE — ANESTHESIA POSTPROCEDURE EVALUATION
[facility] Patient Status:  Inpatient   Age/Gender 60year old female MRN [id number]   [facility] 6NE-A Attending Isabel Caballero MD   Hosp Day # 0 PCP Sarah Maldonado MD       Anesthesia Post-op Note    Procedu

## 2020-03-16 NOTE — ANESTHESIA PROCEDURE NOTES
Arterial Line  Performed by: Maryann Meier MD  Authorized by: Maryann Meier MD     General Information and Staff    Procedure Start:  3/16/2020 12:45 PM  Procedure End:  3/16/2020 12:47 PM  Anesthesiologist:  Maryann Meier MD  Performed By:  Doris Londono Called Pt to no answer, left message to call office to receive results.    Navdeep CMA

## 2020-03-16 NOTE — ANESTHESIA PROCEDURE NOTES
Airway  Date/Time: 3/16/2020 12:43 PM  Urgency: elective    Airway not difficult    General Information and Staff    Patient location during procedure: OR  Anesthesiologist: Anibal Hoyos MD  Performed: anesthesiologist     Indications and Patient [employer]

## 2020-03-17 LAB
ANION GAP SERPL CALC-SCNC: 8 MMOL/L (ref 0–18)
BUN BLD-MCNC: 8 MG/DL (ref 7–18)
BUN/CREAT SERPL: 11.9 (ref 10–20)
CALCIUM BLD-MCNC: 7.7 MG/DL (ref 8.5–10.1)
CHLORIDE SERPL-SCNC: 110 MMOL/L (ref 98–112)
CO2 SERPL-SCNC: 22 MMOL/L (ref 21–32)
CREAT BLD-MCNC: 0.67 MG/DL (ref 0.55–1.02)
DEPRECATED RDW RBC AUTO: 42.6 FL (ref 35.1–46.3)
ERYTHROCYTE [DISTWIDTH] IN BLOOD BY AUTOMATED COUNT: 13.2 % (ref 11–15)
GLUCOSE BLD-MCNC: 135 MG/DL (ref 70–99)
HAV IGM SER QL: 2 MG/DL (ref 1.6–2.6)
HCT VFR BLD AUTO: 37.5 % (ref 35–48)
HGB BLD-MCNC: 12.2 G/DL (ref 12–16)
MCH RBC QN AUTO: 28.6 PG (ref 26–34)
MCHC RBC AUTO-ENTMCNC: 32.5 G/DL (ref 31–37)
MCV RBC AUTO: 87.8 FL (ref 80–100)
OSMOLALITY SERPL CALC.SUM OF ELEC: 290 MOSM/KG (ref 275–295)
PHOSPHATE SERPL-MCNC: 2.7 MG/DL (ref 2.5–4.9)
PLATELET # BLD AUTO: 326 10(3)UL (ref 150–450)
POTASSIUM SERPL-SCNC: 3.8 MMOL/L (ref 3.5–5.1)
RBC # BLD AUTO: 4.27 X10(6)UL (ref 3.8–5.3)
SODIUM SERPL-SCNC: 140 MMOL/L (ref 136–145)
WBC # BLD AUTO: 16.4 X10(3) UL (ref 4–11)

## 2020-03-17 PROCEDURE — 99291 CRITICAL CARE FIRST HOUR: CPT | Performed by: INTERNAL MEDICINE

## 2020-03-17 RX ORDER — POTASSIUM CHLORIDE 20 MEQ/1
40 TABLET, EXTENDED RELEASE ORAL ONCE
Status: COMPLETED | OUTPATIENT
Start: 2020-03-17 | End: 2020-03-17

## 2020-03-17 NOTE — PLAN OF CARE
Assumed pt care at 1800. Vital signs were stable. Pt is on RA, neuro is intact, and art line was taken out. Pt has a posterior incision and maier. Pt went down for CT scan at 1900; awaiting results.

## 2020-03-17 NOTE — CM/SW NOTE
PT recommending acute rehab, [facility] patient would like to go to [facility] PMR consult entered. Await determination.     Everett Mooney RN [employer]  [phone]

## 2020-03-17 NOTE — PLAN OF CARE
Patient drowsy but oriented, follows commands, converses appropriately. States she only has dizziness when her HOB is lowered, and her pain is mostly tolerable. She denies any numbness or tingling or nausea. Tolerating clear liquids, urine per catheter.  Gi

## 2020-03-17 NOTE — CONSULTS
.[address] [place] [address] Patient Status:  Inpatient    1960 MRN [id number]   [facility] 6NE-A Attending Fly Pacheco MD   Hosp Day # 1 PCP Rupali London MD     Patient Identification  Giorgio Nino is a 60 • GERD (gastroesophageal reflux disease)    • Low back pain    • Muscle weakness     bilateral arms and legs   • Plantar fasciitis 6/17/2013   • Plantar plate injury 0/1/7482   • Rheumatoid arthritis ([facility]    • Subluxation of metatarsophalangeal joint of PRN  ondansetron HCl (ZOFRAN) injection 4 mg, 4 mg, Intravenous, Q4H PRN  Metoclopramide HCl (REGLAN) injection 10 mg, 10 mg, Intravenous, Q6H PRN  diphenhydrAMINE (BENADRYL) cap/tab 25 mg, 25 mg, Oral, Q4H PRN    Or  diphenhydrAMINE HCl (BENADRYL) injecti HIVES  Cefuroxime Axetil       HIVES  Dust Mites              ASTHMA  Epinephrine             SWELLING    Comment:Swelling and redness at injection site  Mold                    ASTHMA  Sulfa Antibiotics       Dyspnea  Corn                    OTHER (SEE CO Resonant, clear breath sounds, quiet accessory muscles. Chest wall:  No tenderness or deformity. Cardiovascular:  Heart with regular rate rhythm, no murmurs appreciated. Radial and pedal pulses good. No cyanosis in all extremities.    Abdomen:   No tend Thank you for the consult. Shefali Arroyo MD  [facility].

## 2020-03-17 NOTE — CONSULTS
General Medicine Consult      Consulted by: Lele Patterson MD    PCP: Cassie Howard MD    Reason for Consultation: Medical Management    Attempted to see pt earlier, using bathroom.      History of Present Illness: Patient is a 60year old female wi Comment:Swelling and redness at injection site  Mold                    ASTHMA  Sulfa Antibiotics       Dyspnea  Corn                    OTHER (SEE COMMENTS)    Comment:lightheadedness  Egg                     OTHER (SEE COMMENTS)    Comment:lightheadednes    CO2 22.0   BUN 8   CREATSERUM 0.67   *   CA 7.7*   MG 2.0   PHOS 2.7       No results for input(s): ALT, AST, ALB, AMYLASE, LIPASE, LDH in the last 168 hours.     Invalid input(s): ALPHOS, TBIL, DBIL, TPROT      Radiology: Xr Chest Pa + La LBP, who p/t [facility] for scheduled surgery by Dr. Sophie Owusu. # Chiari I malformation; Gait abnormality; Cognitive change; Weakness of both legs; Vertigo;  Dizziness; Cervical stenosis of spinal canal; Lumbar spondylosis   -S/P CRANIECTOMY FOR CHIARI MALFORMATION

## 2020-03-17 NOTE — PHYSICAL THERAPY NOTE
PHYSICAL THERAPY EVALUATION - INPATIENT     Room Number: 5867/1615-C  Evaluation Date: 3/17/2020  Type of Evaluation: Initial  Physician Order: PT Eval and Treat    Presenting Problem: Chiari 1 malformation s/p craniectomy for chiari malformation decompr been affected. SUBJECTIVE  \"I can't go home like this. I have to be able to cook because I'm alone. And I wouldn't trust myself in the shower right now. \"    Patient self-stated goal is \"to be normal again, and this is a long way from normal.\"  Pt r MOBILITY  How much difficulty does the patient currently have. ..  -   Turning over in bed (including adjusting bedclothes, sheets and blankets)?: A Little   -   Sitting down on and standing up from a chair with arms (e.g., wheelchair, bedside commode, etc. in independent bed mobility, transfers, and gait. The patient is below baseline and would benefit from skilled inpatient PT to address the above deficits to assist patient in returning to prior to level of function.   Functional outcome measures completed

## 2020-03-17 NOTE — PLAN OF CARE
Assumed pt care at 0730. Pt a/o x4. Neuros intact. See flowsheet for full documentation. VSS. NSR. Pt c/o mild HA and dizziness but improved from before surgery. Posterior head dressing C/D/I. Tolerating diet.  PT/OT- pt up to chair and ambulating in hallwa

## 2020-03-17 NOTE — PROGRESS NOTES
[facility]  Neurosurgery Progress Note    Sharri Rm Patient Status:  Inpatient    1960 MRN [id number]   [facility] 6NE-A Attending Diomedes Howard MD   Hosp Day # 1 PCP Jennifer Gutiérrez MD     Chief Complaint:  Chiari hydrocephalus or bleed. No intra-axial lesion is identified.     Assessment:  60year old female s/p craniectomy for Chiari malformation decompression, duroplasty POD #1    Plan:  Discussed with Dr. Zain Alcocer on rounds  [address] [address] to transfer to floor  Pain medication

## 2020-03-17 NOTE — OCCUPATIONAL THERAPY NOTE
OCCUPATIONAL THERAPY EVALUATION - INPATIENT     Room Number: 0854/1621-V  Evaluation Date: 3/17/2020  Type of Evaluation: Initial  Presenting Problem: crani for Chiari Malformation    Physician Order: IP Consult to Occupational Therapy  Reason for Therapy: and I/ADL's. Pt works as a . Pt reports since being diagnosed she has had bouts of vertigo causing multiple falls. Additionally, she states her memory has been affected.     SUBJECTIVE   Pt stated, \"I cannot go home until I can do ev taking off regular lower body clothing?: A Lot  -   Bathing (including washing, rinsing, drying)?: A Lot  -   Toileting, which includes using toilet, bedpan or urinal? : A Little  -   Putting on and taking off regular upper body clothing?: A Little  -   Ta deficits, maximizing patient’s ability to return safely to her prior level of function.     Patient Complexity  Occupational Profile/Medical History MODERATE - Expanded review of history including review of medical or therapy record   Specific performance d

## 2020-03-17 NOTE — CM/SW NOTE
03/17/20 1000   CM/SW Referral Data   Referral Source Physician   Reason for Referral Discharge planning   Informant Patient   Social History   Recreational Drug/Alcohol Use no   Major Changes Last 6 Months no   Domestic/Partner Violence no   Suicidal I

## 2020-03-17 NOTE — PLAN OF CARE
Assumed pt care at 1530  VSS  A&O x4, Neuros Q4, slight weakness in extremities, up w/SB  Pt complaint of head and neck pain- Scheduled Norflex given w/relief  Back Head dressing c/d/I  Plan to d/c to [facility]  POC dicussed with pt  Will continue to monitor

## 2020-03-18 LAB — POTASSIUM SERPL-SCNC: 3.8 MMOL/L (ref 3.5–5.1)

## 2020-03-18 RX ORDER — CYCLOBENZAPRINE HCL 10 MG
10 TABLET ORAL 3 TIMES DAILY PRN
Status: DISCONTINUED | OUTPATIENT
Start: 2020-03-18 | End: 2020-03-19

## 2020-03-18 RX ORDER — TRAMADOL HYDROCHLORIDE 50 MG/1
50 TABLET ORAL EVERY 8 HOURS PRN
Qty: 20 TABLET | Refills: 0 | Status: SHIPPED | OUTPATIENT
Start: 2020-03-18 | End: 2020-06-11 | Stop reason: ALTCHOICE

## 2020-03-18 RX ORDER — CYCLOBENZAPRINE HCL 10 MG
10 TABLET ORAL 3 TIMES DAILY PRN
Qty: 60 TABLET | Refills: 0 | Status: SHIPPED | OUTPATIENT
Start: 2020-03-18 | End: 2020-06-11 | Stop reason: ALTCHOICE

## 2020-03-18 RX ORDER — TRAMADOL HYDROCHLORIDE 50 MG/1
TABLET ORAL EVERY 6 HOURS PRN
Status: DISCONTINUED | OUTPATIENT
Start: 2020-03-18 | End: 2020-03-19

## 2020-03-18 RX ORDER — POTASSIUM CHLORIDE 20 MEQ/1
40 TABLET, EXTENDED RELEASE ORAL ONCE
Status: COMPLETED | OUTPATIENT
Start: 2020-03-18 | End: 2020-03-18

## 2020-03-18 RX ORDER — ONDANSETRON 4 MG/1
4 TABLET, FILM COATED ORAL EVERY 8 HOURS PRN
Status: DISCONTINUED | OUTPATIENT
Start: 2020-03-18 | End: 2020-03-19

## 2020-03-18 NOTE — OCCUPATIONAL THERAPY NOTE
OCCUPATIONAL THERAPY TREATMENT NOTE - INPATIENT     Room Number: 8324/9251-E  Session: 1   Number of Visits to Meet Established Goals: 5    Presenting Problem: crani for Chiari Malformation    History related to current admission:  Pt is 60year old female (including washing, rinsing, drying)?: A Lot  -   Toileting, which includes using toilet, bedpan or urinal? : A Little  -   Putting on and taking off regular upper body clothing?: A Little  -   Taking care of personal grooming such as brushing teeth?: CAROLYN Duran concerns addressed    ASSESSMENT   Pt demonstrated improved knowledge about LE dressing sequencing and AE use.   Current limitations: impaired memory, impaired attention, impaired ability to process multi-step instructions, decreased neck ROM, decreased R L

## 2020-03-18 NOTE — PLAN OF CARE
Assumed care at 12pm  Neuros q4h. No deficits noted  Pt complaint of headache coming and going but refused pain medication  C/o intermittent nausea. Reglan given IV. C/o both IVs burning.  Got zofran ordered sublingual  Ot worked with patient and put note i

## 2020-03-18 NOTE — CM/SW NOTE
Dr Dayna Freitas saw pt and wanted OT to see before she made a decision regarding acute rehab. OT saw pt and is recommending acute rehab.   Dr Dayna Freitas will look at OT note and decide if pt will be appropriate for AR at [place] [address].

## 2020-03-18 NOTE — PROGRESS NOTES
Pt a/o x 4. S/p craniectomy for chiari malformation. Still c/o headache and tightness on her neck and pain on the frontal area. Pt has been on scheduled norflex but pt stated that it has no effect on her.  Suggested to let her take valium instead to relax h

## 2020-03-18 NOTE — PROGRESS NOTES
[facility]  Neurosurgery Progress Note    Bethtaisha Guardadokassy Patient Status:  Inpatient    1960 MRN [id number]   [facility] 7NE-A Attending Emmanuel Chaudhary MD   Hosp Day # 2 PCP Sarbjit Royal MD     Chief Complaint:  Chiari

## 2020-03-18 NOTE — PLAN OF CARE
Assumed care of patient at 07:00 am  A/O x 4. C/O headache and tightness frontal head area and posterior neck. States relief with PRN Tramadol. Posterior head surgical dressing in place C/D/I.    OT evaluated patient, pending acceptance from  vs home Progressing     Problem: Impaired Activities of Daily Living  Goal: Achieve highest/safest level of independence in self care  Description  Interventions:  - Assess ability and encourage patient to participate in ADLs to maximize function  - Promote sittin Home

## 2020-03-18 NOTE — PROGRESS NOTES
[facility] Note                                                                   [facility]  7/21/1960    CC: FU post op    Interval History:  - Doing well, mild HA, p in place  Pulm: Lungs clear bilaterally, normal respiratory effort  CV: Heart with regular rate and rhythm  GI: Abdomen soft, nontender, nondistended, no organomegaly, bowel sounds present  Ext: No cyanosis, clubbing, or edema  Skin: Skin warm and dry.  No

## 2020-03-19 VITALS
SYSTOLIC BLOOD PRESSURE: 149 MMHG | WEIGHT: 184.31 LBS | OXYGEN SATURATION: 94 % | HEART RATE: 94 BPM | TEMPERATURE: 98 F | HEIGHT: 58 IN | RESPIRATION RATE: 18 BRPM | DIASTOLIC BLOOD PRESSURE: 87 MMHG | BODY MASS INDEX: 38.69 KG/M2

## 2020-03-19 LAB
BASOPHILS # BLD AUTO: 0.05 X10(3) UL (ref 0–0.2)
BASOPHILS NFR BLD AUTO: 0.4 %
DEPRECATED RDW RBC AUTO: 44 FL (ref 35.1–46.3)
EOSINOPHIL # BLD AUTO: 0.1 X10(3) UL (ref 0–0.7)
EOSINOPHIL NFR BLD AUTO: 0.8 %
ERYTHROCYTE [DISTWIDTH] IN BLOOD BY AUTOMATED COUNT: 13.5 % (ref 11–15)
HCT VFR BLD AUTO: 41.2 % (ref 35–48)
HGB BLD-MCNC: 12.9 G/DL (ref 12–16)
IMM GRANULOCYTES # BLD AUTO: 0.06 X10(3) UL (ref 0–1)
IMM GRANULOCYTES NFR BLD: 0.5 %
LYMPHOCYTES # BLD AUTO: 2.67 X10(3) UL (ref 1–4)
LYMPHOCYTES NFR BLD AUTO: 21.1 %
MCH RBC QN AUTO: 28.2 PG (ref 26–34)
MCHC RBC AUTO-ENTMCNC: 31.3 G/DL (ref 31–37)
MCV RBC AUTO: 90 FL (ref 80–100)
MONOCYTES # BLD AUTO: 1.05 X10(3) UL (ref 0.1–1)
MONOCYTES NFR BLD AUTO: 8.3 %
NEUTROPHILS # BLD AUTO: 8.73 X10 (3) UL (ref 1.5–7.7)
NEUTROPHILS # BLD AUTO: 8.73 X10(3) UL (ref 1.5–7.7)
NEUTROPHILS NFR BLD AUTO: 68.9 %
PLATELET # BLD AUTO: 346 10(3)UL (ref 150–450)
POTASSIUM SERPL-SCNC: 3.7 MMOL/L (ref 3.5–5.1)
RBC # BLD AUTO: 4.58 X10(6)UL (ref 3.8–5.3)
WBC # BLD AUTO: 12.7 X10(3) UL (ref 4–11)

## 2020-03-19 RX ORDER — POTASSIUM CHLORIDE 20 MEQ/1
40 TABLET, EXTENDED RELEASE ORAL ONCE
Status: COMPLETED | OUTPATIENT
Start: 2020-03-19 | End: 2020-03-19

## 2020-03-19 NOTE — PLAN OF CARE
Assumed care at [facility]. Patient A&Ox4. Neuros q 4, no acute changes. Denies HA and nausea. Plan for [facility] to re-eval tomorrow. Will cont to monitor.

## 2020-03-19 NOTE — DISCHARGE SUMMARY
[facility]  Discharge Summary    Giorgio Nino Patient Status:  Inpatient    1960 MRN [id number]   [facility] 7NE-A Attending Fly Pacheco MD   [facility] Day # 3 PCP Rupali London MD     Date of Admission: 3/16/2020    D Walking into walls and furniture. Eyes now light sensitivity.         Last hx:  here for neurosurgical consultation for recently diagnosed Chiari malformation.  States in April and May of this year she started getting a lot of pressure in the back of her he LAWRENCE     Complications: None    Discharge Condition: Stable    Disposition: Final discharge disposition not confirmed    Discharge Medications: Current Discharge Medication List    START taking these medications    cyclobenzaprine 10 MG Oral Tab  Take 1 tabl [facility])    Apr 03, 2020 10:30 AM CDT Post Op Visit with ALLY Arguelles [address], [address] [place] [address] ([address])        Apr 30, 2020  2:00 PM CDT Post Op Visit with Luc Kwon MD [facility], S

## 2020-03-19 NOTE — CM/SW NOTE
Pt has been approved to go to [place] [address] and is ready for d/c today. Ailyn from [place] [address] said pt will go Room 1166. RN to call report to [phone].   Pt's family will  pt at 1:00pm today and drive pt to .

## 2020-03-19 NOTE — PLAN OF CARE
Neuros q4h. No deficits noted  Pt complaint of headache coming and going but refused pain medication  [facility] needed updated CBC on patient. Done  [facility] accepted, bed for patient. Tried to call report.  Nurse to call back  IV out, tele off, went over d/c paperwork

## 2020-03-19 NOTE — PROGRESS NOTES
[facility]  Neurosurgery Progress Note    Ana Saavedra Patient Status:  Inpatient    1960 MRN [id number]   [facility] 7NE-A Attending Darrell Mcmahon MD   Hosp Day # 3 PCP Tonya Hanna MD     Chief Complaint:  Chiari

## 2020-03-20 LAB — BLOOD TYPE BARCODE: 6200

## 2020-03-20 NOTE — OPERATIVE REPORT
[facility]    OPERATIVE REPORT    Patient:  Colleen Galvan;  YOB: 1960     [facility]:  [id number]; Medical Record Number:  [id number]    Admission Date:  3/16/2020 Operation Date:  3/16/2020    . ..........................     Operating Phys Appropriate padding was instituted. Hair was clipped from the suboccipital region to the C3 region in the midline and the area was prepped and draped in a sterile fashion.      The procedure was initiated with a midline incision from the suboccipital regio below the foramen magnum to create a essentially square-simone dural opening.   The arachnoid was then opened in the midline and mini clips were used to tag the arachnoid to the edges of the opened dura so they could be incorporated in the sutures used to plac suture line to a minimum. As the sutures were placed the small mini clips holding the arachnoid against the dura were sequentially removed. Once the dural patch was sewn in place a Valsalva maneuver confirmed no major leaks.   A layer of Adherus fibrin se

## 2020-03-20 NOTE — CM/SW NOTE
03/20/20 0900   Discharge disposition   Expected discharge disposition Rehab [address]   Name of Facillity/Home Care/Hospice [facility]   Patient is Discharged to a [address] [place] [address] Yes   Discharge transportation Private car

## 2020-03-23 ENCOUNTER — TELEPHONE (OUTPATIENT)
Dept: SURGERY | Facility: CLINIC | Age: 60
End: 2020-03-23

## 2020-03-23 NOTE — TELEPHONE ENCOUNTER
Appointment inquiry noted. Patient underwent CRANIECTOMY FOR CHIARI MALFORMATION DECOMPRESSION. DUROPLASTY WITH GRAFTING on 3/16/20 with Dr. Purnima Mejia. Patient discharged to [facility] on 3/19/20 for acute rehab.     OV scheduled to be seen with Linda Vogel

## 2020-03-24 NOTE — TELEPHONE ENCOUNTER
Located forms which had been endorsed to [place], [place]. Printed up [address] notes, imaging results, OP report to be included when faxing documents to [facility], see page 5 of 8.       Routed on to [place], [place].

## 2020-03-24 NOTE — TELEPHONE ENCOUNTER
RN from [facility] S[facility] calling on behalf of pt to inquire on status of forms, please call pt if needed

## 2020-03-24 NOTE — TELEPHONE ENCOUNTER
Noted that provider, ALLY Spivey messaged that he will be in clinic this week anticipating completion of paperwork for patient's disability documents.       Endorsed to Chepe Spivey.

## 2020-03-26 NOTE — TELEPHONE ENCOUNTER
Medical request forms faxed to [facility] along with LOV notes, imaging results, OP report. To fax number:  [phone] for scanning printed. Documents copied and sent to scan to chart. Noted no patient signature on page 8/8.

## 2020-03-30 ENCOUNTER — TELEPHONE (OUTPATIENT)
Dept: NEUROLOGY | Facility: CLINIC | Age: 60
End: 2020-03-30

## 2020-03-30 NOTE — TELEPHONE ENCOUNTER
Patient s/p craniectomy for chiari malformation on 3/16/20 with Dr. Pat Beckford:     In telephone appointment question, message has been noted:               Appt Question \"Dr Maine Guerrero with [facility] would like to know if pts staples can be taken out at [facility]

## 2020-03-30 NOTE — TELEPHONE ENCOUNTER
Called Dr Lawyer Patel. Patient is doing very well and improving. Incision is healing well. No CSF leakage or collection. He will take her staples out and give Rx for a month. We can do an e visit Friday.     She will be discharged [facility] rehab Tuesday

## 2020-04-03 ENCOUNTER — VIRTUAL PHONE E/M (OUTPATIENT)
Dept: SURGERY | Facility: CLINIC | Age: 60
End: 2020-04-03
Payer: COMMERCIAL

## 2020-04-03 DIAGNOSIS — G93.5 CHIARI I MALFORMATION (HCC): Primary | ICD-10-CM

## 2020-04-03 PROCEDURE — 1111F DSCHRG MED/CURRENT MED MERGE: CPT | Performed by: NEUROLOGICAL SURGERY

## 2020-04-03 PROCEDURE — 99024 POSTOP FOLLOW-UP VISIT: CPT | Performed by: NEUROLOGICAL SURGERY

## 2020-04-03 NOTE — PROGRESS NOTES
I did a telephone consultation with MsRichard Flor Horacios today. She is status post a suboccipital craniectomy for Chiari malformation which was performed on March 16, 2020. Preoperatively she has symptoms of pressure when laying down in her head.   There

## 2020-04-16 ENCOUNTER — PATIENT MESSAGE (OUTPATIENT)
Dept: SURGERY | Facility: CLINIC | Age: 60
End: 2020-04-16

## 2020-04-16 DIAGNOSIS — R13.10 DYSPHAGIA, UNSPECIFIED TYPE: Primary | ICD-10-CM

## 2020-04-16 NOTE — TELEPHONE ENCOUNTER
From: Salena Deal  To: Isak Odonnell MD  Sent: 4/16/2020 12:23 PM CDT  Subject: Non-Urgent Medical Question    Hi,  I had surgery on March 16. One month later and my voice is still affected. I am horse, unable to project and pain after talking.  Apolonia Ybarra

## 2020-04-16 NOTE — TELEPHONE ENCOUNTER
Dr. Natty Meyer in office tomorrow, please add to list of questions. Let's get his recommendations on this patient.  She has not seen him since surgery, may require MD call, thanks

## 2020-04-23 ENCOUNTER — TELEPHONE (OUTPATIENT)
Dept: SURGERY | Facility: CLINIC | Age: 60
End: 2020-04-23

## 2020-04-24 NOTE — TELEPHONE ENCOUNTER
Dr. Zain Alcocer recommends patient seeing her ENT. Please see Dr. Juventino Rizo message below.  Thank you

## 2020-04-28 NOTE — TELEPHONE ENCOUNTER
Patient and provider's messages noted. Patient to be seen by ENT as recommended by Dr. Pat Peter. Patient has 4/30/20 televisit scheduled with Dr. Pat Peter to discuss concerns.

## 2020-05-01 ENCOUNTER — VIRTUAL PHONE E/M (OUTPATIENT)
Dept: SURGERY | Facility: CLINIC | Age: 60
End: 2020-05-01
Payer: COMMERCIAL

## 2020-05-01 DIAGNOSIS — G93.5 CHIARI I MALFORMATION (HCC): Primary | ICD-10-CM

## 2020-05-01 PROCEDURE — 99024 POSTOP FOLLOW-UP VISIT: CPT | Performed by: NEUROLOGICAL SURGERY

## 2020-05-01 NOTE — TELEPHONE ENCOUNTER
Patient did not have phone visit with provider yesterday due to provider schedule, patient requesting callback from provider when available, would like to discuss FMLA paperwork and return to work.

## 2020-05-01 NOTE — PROGRESS NOTES
Virtual Telephone Check-In    Elise Andrade verbally consents to a Virtual/Telephone Check-In visit on 05/01/20. Patient understands and accepts financial responsibility for any deductible, co-insurance and/or co-pays associated with this service. Examination: When asked it sounds as if it is her deltoid which is primarily weak. When phonating her vocal cords sound intact of the power of her voice is slightly weak.   She states her incision is healing very well and she does not feel any subcutaneous

## 2020-05-05 ENCOUNTER — TELEPHONE (OUTPATIENT)
Dept: SURGERY | Facility: CLINIC | Age: 60
End: 2020-05-05

## 2020-05-21 ENCOUNTER — TELEPHONE (OUTPATIENT)
Dept: SURGERY | Facility: CLINIC | Age: 60
End: 2020-05-21

## 2020-05-21 NOTE — TELEPHONE ENCOUNTER
from te 4/30 completed Statement of Incapacity/Attending Physician Statement form to be faxed. Form completed. Faxed to [phone]. Copy sent to scanning.

## 2020-06-11 ENCOUNTER — OFFICE VISIT (OUTPATIENT)
Dept: SURGERY | Facility: CLINIC | Age: 60
End: 2020-06-11
Payer: COMMERCIAL

## 2020-06-11 VITALS — DIASTOLIC BLOOD PRESSURE: 80 MMHG | SYSTOLIC BLOOD PRESSURE: 124 MMHG | HEART RATE: 82 BPM

## 2020-06-11 DIAGNOSIS — G93.5 CHIARI I MALFORMATION (HCC): Primary | ICD-10-CM

## 2020-06-11 PROCEDURE — 99024 POSTOP FOLLOW-UP VISIT: CPT | Performed by: NEUROLOGICAL SURGERY

## 2020-06-11 NOTE — PROGRESS NOTES
Neurosurgery  Follow up      [address] is a 60year old RH female here in follow up after 3/16/2020 Chiari decompression Dr. Patricia Felix. 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 She has been at home.  Gained weigh any thoracic pain denies any low back pain. Her legs were weak for several weeks back in the spring she is had some buckling of the legs in the morning especially with her knees.   She has had 3 rounds of steroids 40 mg of prednisone seems to help but does SWELLING    Comment:Swelling and redness at injection site  Mold                    ASTHMA  Sulfa Antibiotics       Dyspnea  Corn                    OTHER (SEE COMMENTS)    Comment:lightheadedness  Egg                     OTHER (SEE COMMENTS) malformation with 13 mm tonsillar extension.   She has cervical spondylosis at C4-5 C5-6 and C6-7    MRI brain 12/9/19  there is a Chiari 1.5 malformation, with peglike cerebellar  tonsils, measuring 13 mm below the foramen magnum, including medulla bulb an

## 2020-06-15 NOTE — TELEPHONE ENCOUNTER
Return to Work Certification letter signed by Dr. Zain Alcocer along with LOV notes and patient face sheet sent to [employer] to fax number:    [phone]    Form copied and sent to scan along with Barcode from [address] [place] [address].

## 2020-09-08 ENCOUNTER — HOSPITAL ENCOUNTER (OUTPATIENT)
Dept: MRI IMAGING | Facility: HOSPITAL | Age: 60
Discharge: HOME | End: 2020-09-08
Attending: PHYSICIAN ASSISTANT
Payer: COMMERCIAL

## 2020-09-08 DIAGNOSIS — G93.5 CHIARI I MALFORMATION (HCC): ICD-10-CM

## 2020-09-08 PROCEDURE — 70553 MRI BRAIN STEM W/O & W/DYE: CPT | Performed by: PHYSICIAN ASSISTANT

## 2020-09-08 PROCEDURE — A9575 INJ GADOTERATE MEGLUMI 0.1ML: HCPCS | Performed by: PHYSICIAN ASSISTANT

## 2020-09-10 ENCOUNTER — OFFICE VISIT (OUTPATIENT)
Dept: SURGERY | Facility: CLINIC | Age: 60
End: 2020-09-10
Payer: COMMERCIAL

## 2020-09-10 VITALS — HEART RATE: 82 BPM | DIASTOLIC BLOOD PRESSURE: 72 MMHG | SYSTOLIC BLOOD PRESSURE: 122 MMHG

## 2020-09-10 DIAGNOSIS — G93.5 CHIARI I MALFORMATION (HCC): Primary | ICD-10-CM

## 2020-09-10 DIAGNOSIS — G96.198 PSEUDOMENINGOCELE: ICD-10-CM

## 2020-09-10 DIAGNOSIS — Z98.890 STATUS POST CRANIECTOMY: ICD-10-CM

## 2020-09-10 PROCEDURE — 3074F SYST BP LT 130 MM HG: CPT | Performed by: PHYSICIAN ASSISTANT

## 2020-09-10 PROCEDURE — 99214 OFFICE O/P EST MOD 30 MIN: CPT | Performed by: PHYSICIAN ASSISTANT

## 2020-09-10 PROCEDURE — 3078F DIAST BP <80 MM HG: CPT | Performed by: PHYSICIAN ASSISTANT

## 2020-09-10 RX ORDER — ACETAZOLAMIDE 500 MG/1
500 CAPSULE, EXTENDED RELEASE ORAL 2 TIMES DAILY
Qty: 60 CAPSULE | Refills: 0 | Status: SHIPPED | OUTPATIENT
Start: 2020-09-10 | End: 2020-10-10

## 2020-09-10 NOTE — PROGRESS NOTES
Neurosurgery  Follow up      [address] is a 63year old RH female here in follow up after 3/16/2020 Chiari decompression Dr. Azar Conley. She is getting increasing HAs. Increased neck pain and tightness with shooting pain.  Sin seeing an orthopedist who ordered an MRI of her cervical spine find a Chiari malformation. She does have occipital pressure and pain. She complains of dizziness, vertigo, lightheadedness, unsteadiness with walking. She is fallen on 3 occasions.   Her o includes Allergies in her brother, father, and sister; Cancer in her brother, father, and sister; Heart Disorder in her father and mother; Hypertension in her father; Musculo-skelatal Disorder in her father and mother.     SOCIAL HISTORY:   reports that she Negative PD, NL F2N. Fine motor intact. Heel to shin intact. Shrug shoulders normally bilaterally. cranial nerves II-XII are grossly intact. Gait intact. Rhomberg negative.   Upper extremity strength: last exam      Deltoid    Triceps     Biceps       Gri stenosis    ASSESSMENT:  1. Chiari decompression 3/16/2020  2. Cervical stenosis C4-5 C5-6 C6-7  3. Cognitive changes  4. Gait dysfunction  5. Rheumatoid arthritis  6. L4-5 grade I listhesis and L3-4-5 spinal stenosis  7.  Vocal cord partial paralysis

## 2020-09-10 NOTE — PATIENT INSTRUCTIONS
Refill policies:    • Allow 2-3 business days for refills; controlled substances may take longer.   • Contact your pharmacy at least 5 days prior to running out of medication and have them send an electronic request or submit request through the “request re Depending on your insurance carrier, approval may take 3-10 days. It is highly recommended patients contact their insurance carrier directly to determine coverage.   If test is done without insurance authorization, patient may be responsible for the entire medication, observation, last option shunting.  -Diamox 500 mg BID, x 4 weeks, Dr Lance Blizzard discussed with pharmacist at [facility] and I discussed hers at [facility]. Low probability she with get SOB. S/S to watch for were discussed.    - let us know how she is doing in

## 2020-09-10 NOTE — PROGRESS NOTES
Pt is here for follow up. Pt was last seen in the office 6/11/2020     MRI of the brain: Obtained     ENT: Pt seen ENT yesterday     Pt states that she has seen worsening in tightness in the cervical with shooting pain.  Pt states that she has seen increase

## 2020-09-17 NOTE — TELEPHONE ENCOUNTER
acetazolamide was rxd by Dr Pat Beckford per patient; having side effects. Pt having sort of dizziness and tingling in hands in feet that goes up arms and legs. She wants to make sure this is not going to be permanent.  The tingling started within 10 minutes of ta

## 2020-09-17 NOTE — TELEPHONE ENCOUNTER
Spoke with patient who stated she started the diamox on Saturday and within 10 minutes of taking it she started to experience tingling to bilateral arms/legs and some light headedness as well.      Symptoms have been ongoing since Saturday and they do not s

## 2020-09-17 NOTE — TELEPHONE ENCOUNTER
Dr Deacon Oliver feels the N/T is likely medication. If she is doing better and can tolerate the N/T then continue 3 more weeks and we will wean. If not we can wean and see how she does.

## 2020-09-18 NOTE — TELEPHONE ENCOUNTER
Called patient who stated that her main concern was the numbness and tingling in her lips since starting Diamox. She can tolerate the n/t in her arms and feet. She also has had an increase in SOB with movement which concerns her as well.   She has an todd

## 2020-09-18 NOTE — TELEPHONE ENCOUNTER
Patient was called she states her fog has lifted she did have some shortness of breath and tingling in her lips and arms and legs she stopped medication last night the shortness of breath is improved tremendously she still has some tingling in her lips and

## 2020-09-23 ENCOUNTER — TELEPHONE (OUTPATIENT)
Dept: SURGERY | Facility: CLINIC | Age: 60
End: 2020-09-23

## 2020-09-23 NOTE — TELEPHONE ENCOUNTER
Spoke with patient who stated she feels like she is reverting back. Patient c/o tired, stress, can't concentrate and memory problems. Patient is wanting to proceed with procedure.      Informed patient Jennifer Horn and Prema Waite are out of the office toda

## 2020-09-28 NOTE — TELEPHONE ENCOUNTER
Spoke with patient who stated that:    -she has considered her options after LOV with Dr. Natty Meyer and ALLY Mauro and would like to go ahead with surgery ASAP.     -she has home care/care partner issues related to the pandemic and she would like to un

## 2020-09-28 NOTE — TELEPHONE ENCOUNTER
Per Dr. Kristie Toribio in routing comment today:    \"Schedule her for a lumbar drain trial. [facility] Tuesday 7:30 AM except October 6. Tell the operating room will use a standard collection system, not the flow limiting system.     Thanks   [facility]\"    You are schedule washcloth apply 20 percent of the Hibiclens to your body.  Wash from the neck down avoiding the genital (private) areas and concentrating on the surgical area  Rinse off very thoroughly  Dry yourself with a clean, dry towel  Put on clean clothes  Do not use [phone]. Nursing to place on neurosurgeons outlook calendar to be notified of LDT date for rounding purposes.     PA:  Clementina

## 2020-09-28 NOTE — TELEPHONE ENCOUNTER
e[employer] by [employer] message sent to patient with pre-op instructions and information. PA team, PSR and providers notified of surgery date. Called [phone] SLP and LM regarding LDT arrangements. Patient information provided.      Noted that EKG and CXR had been compl

## 2020-10-02 NOTE — TELEPHONE ENCOUNTER
Please advise where to schedule, Dr. Kym Lefort schedule around 10/29 is full      PA-[employer], Surgery date, lumbar drain trial with Dr. Evi Montes on 10/12/20.  PSR-2 week post op appointment with Dr. Evi Montes, Please. Ivory Stein you!     Routing comment

## 2020-10-05 NOTE — TELEPHONE ENCOUNTER
Called [facility] [phone] and spoke with Lupe Ward in the PA department. Prior authorization request completed for:  Insertion of Lumbar Drain  Authorization #[id number]   Authorization dates: 10/12/2020  CPT codes approved: 16728  Number of visits/dates

## 2020-10-05 NOTE — TELEPHONE ENCOUNTER
Medication: Acetazolamide 500 Mg     Date of last refill: 9/10/2020 60 Tabs     [place]     Last office visit: 9/10/2020     Due back to clinic per last office note: Post op     Date next office visit scheduled: No Ap

## 2020-10-06 RX ORDER — ACETAZOLAMIDE 500 MG/1
CAPSULE, EXTENDED RELEASE ORAL
Qty: 60 CAPSULE | Refills: 0 | OUTPATIENT
Start: 2020-10-06

## 2020-10-06 NOTE — TELEPHONE ENCOUNTER
Patient had a reaction to Diamox and stopped it. The request for refill was automatic from her pharmacy. Patient was called she is doing okay she is ready for her lumbar drain next Tuesday.

## 2020-10-10 ENCOUNTER — LABORATORY ENCOUNTER (OUTPATIENT)
Dept: LAB | Age: 60
End: 2020-10-10
Attending: NEUROLOGICAL SURGERY
Payer: COMMERCIAL

## 2020-10-10 ENCOUNTER — LAB ENCOUNTER (OUTPATIENT)
Dept: LAB | Age: 60
End: 2020-10-10
Attending: NEUROLOGICAL SURGERY
Payer: COMMERCIAL

## 2020-10-10 DIAGNOSIS — G96.198 PSEUDOMENINGOCELE: ICD-10-CM

## 2020-10-10 DIAGNOSIS — R26.9 NEUROLOGIC GAIT DYSFUNCTION: ICD-10-CM

## 2020-10-10 DIAGNOSIS — R41.89 COGNITIVE CHANGE: ICD-10-CM

## 2020-10-10 PROCEDURE — 80053 COMPREHEN METABOLIC PANEL: CPT

## 2020-10-10 PROCEDURE — 36415 COLL VENOUS BLD VENIPUNCTURE: CPT

## 2020-10-10 PROCEDURE — 85610 PROTHROMBIN TIME: CPT

## 2020-10-10 PROCEDURE — 85730 THROMBOPLASTIN TIME PARTIAL: CPT

## 2020-10-10 PROCEDURE — 85025 COMPLETE CBC W/AUTO DIFF WBC: CPT

## 2020-10-11 LAB — SARS-COV-2 RNA RESP QL NAA+PROBE: NOT DETECTED

## 2020-10-13 ENCOUNTER — ANESTHESIA (OUTPATIENT)
Dept: SURGERY | Facility: HOSPITAL | Age: 60
DRG: 092 | End: 2020-10-13
Payer: COMMERCIAL

## 2020-10-13 ENCOUNTER — ANESTHESIA EVENT (OUTPATIENT)
Dept: SURGERY | Facility: HOSPITAL | Age: 60
DRG: 092 | End: 2020-10-13
Payer: COMMERCIAL

## 2020-10-13 PROBLEM — R41.89 COGNITIVE CHANGE: Status: ACTIVE | Noted: 2020-10-13

## 2020-10-13 RX ORDER — MIDAZOLAM HYDROCHLORIDE 1 MG/ML
INJECTION INTRAMUSCULAR; INTRAVENOUS AS NEEDED
Status: DISCONTINUED | OUTPATIENT
Start: 2020-10-13 | End: 2020-10-13 | Stop reason: SURG

## 2020-10-13 RX ORDER — METOCLOPRAMIDE HYDROCHLORIDE 5 MG/ML
INJECTION INTRAMUSCULAR; INTRAVENOUS AS NEEDED
Status: DISCONTINUED | OUTPATIENT
Start: 2020-10-13 | End: 2020-10-13

## 2020-10-13 RX ORDER — DEXAMETHASONE SODIUM PHOSPHATE 4 MG/ML
VIAL (ML) INJECTION AS NEEDED
Status: DISCONTINUED | OUTPATIENT
Start: 2020-10-13 | End: 2020-10-13 | Stop reason: SURG

## 2020-10-13 RX ORDER — ONDANSETRON 2 MG/ML
INJECTION INTRAMUSCULAR; INTRAVENOUS AS NEEDED
Status: DISCONTINUED | OUTPATIENT
Start: 2020-10-13 | End: 2020-10-13 | Stop reason: SURG

## 2020-10-13 RX ORDER — LIDOCAINE HYDROCHLORIDE 10 MG/ML
INJECTION, SOLUTION EPIDURAL; INFILTRATION; INTRACAUDAL; PERINEURAL AS NEEDED
Status: DISCONTINUED | OUTPATIENT
Start: 2020-10-13 | End: 2020-10-13 | Stop reason: SURG

## 2020-10-13 RX ADMIN — SODIUM CHLORIDE, SODIUM LACTATE, POTASSIUM CHLORIDE, CALCIUM CHLORIDE: 600; 310; 30; 20 INJECTION, SOLUTION INTRAVENOUS at 08:49:00

## 2020-10-13 RX ADMIN — LIDOCAINE HYDROCHLORIDE 50 MG: 10 INJECTION, SOLUTION EPIDURAL; INFILTRATION; INTRACAUDAL; PERINEURAL at 07:45:00

## 2020-10-13 RX ADMIN — MIDAZOLAM HYDROCHLORIDE 2 MG: 1 INJECTION INTRAMUSCULAR; INTRAVENOUS at 07:49:00

## 2020-10-13 RX ADMIN — ONDANSETRON 4 MG: 2 INJECTION INTRAMUSCULAR; INTRAVENOUS at 07:57:00

## 2020-10-13 RX ADMIN — DEXAMETHASONE SODIUM PHOSPHATE 4 MG: 4 MG/ML VIAL (ML) INJECTION at 07:57:00

## 2020-10-13 NOTE — SLP NOTE
SPEECH/LANGUAGE/COGNITIVE EVALUATION - INPATIENT    Admission Date: 10/13/2020  Evaluation Date: 10/13/20    Reason for Referral: (lumbar drain)    ASSESSMENT & PLAN   ASSESSMENT & IMPRESSION  Patient seen for cognitive communication assessment for tashia during the exam, though subtle.   She does admit to high stress at work along with her medical issues, both of which likely contribute to her cognitive deficits though she did report after surgery last year she was able to return to enjoy reading and felt h

## 2020-10-13 NOTE — CONSULTS
[facility]    History and Physical     Negra First Patient Status:  Inpatient    1960 MRN [id number]   [facility] 6NE-A Attending Missy Dumont MD   Hosp Day # 0 PCP Senthil Senior MD     Chief Complaint: Headache CRANIECTOMY FOR CHIARI MALFORMATION N/A 3/16/2020    Performed by Merlyn Rice MD at [address] [place] [address] Dr   • PART EDENT IMPL/FIXED DENT      dental implant   • SINUS SURGERY           Social History:  reports that she has never smoked.  She has never used smoke systems was completed. Pertinent positives and negatives noted in the HPI.     Physical Exam:    /75   Pulse 83   Temp 97 °F (36.1 °C) (Temporal)   Resp 18   Ht 4' 10\" (1.473 m)   Wt 177 lb 14.6 oz (80.7 kg)   SpO2 95%   BMI 37.18 kg/m²   General: symptoms    RA  -stable  -pain controlled    Quality:  · DVT Prophylaxis: scd  · CODE status: Full Code per chart    Dispo: no discharge    Plan of care discussed with patient and staff    Nellie Smith MD  10/13/2020  [facility]

## 2020-10-13 NOTE — H&P
Roberto Farfan [place]   Physician Assistant   Specialty:  Physician Assistant   Progress Notes      Signed   Encounter Date:  9/10/2020               Signed             Neurosurgery  Follow up        HISTORY OF PRESENT ILLNESS:Arabella Garces is a 63 ye here for neurosurgical consultation for recently diagnosed Chiari malformation.   States in April and May of this year she started getting a lot of pressure in the back of her head increased pain with coughing and sneezing and Valsalva she saw her chiroprac PAST SURGICAL HISTORY:        Past Surgical History:   Procedure Laterality Date   • CATARACT Bilateral     • COLONOSCOPY, POSSIBLE BIOPSY, POSSIBLE POLYPECTOMY 84481 N/A 3/3/2017     Performed by Yamileth Kline MD at [address] [place] [address]    PHYSICAL EXAMINATION:  GENERAL:  Patient is in no acute distress. HEENT:  Normocephalic, atraumatic. Voice hoarse. Vowels becoming clear. SKIN: Warm, dry, without rashes. Posterior scar healed. Posterior palpation painful.  Compression causes light hea associated with retroflexion of the odontoid process, and mild compressive effect on the inferior  midbrain, which is mildly flattened.  \"Chiari 1.5 Malformation is defined as a cerebellar tonsillar     MRI TS 12/9/19   Mild multilevel thoracic degenerativ • Subluxation of metatarsophalangeal joint of lesser toe 9/9/2014   • Visual impairment     glasses for reading   • Vitamin D deficiency      Past Surgical History:   Procedure Laterality Date   • BRAIN SURGERY  03/16/2020    Chiari decompression   • CATAR

## 2020-10-13 NOTE — PROGRESS NOTES
Pt rec'd post/op lumbar drain placement. Has a Integra external CSF drainage system not a limitorr. Difficult to maintain 10 ml/hr. Pt very sensitive to output. Pt does state \"feel a whole lot better than prior to surgery\".   Pt's gait steady while walkin

## 2020-10-13 NOTE — H&P (VIEW-ONLY)
Chepe Peña   Physician Assistant   Specialty:  Physician Assistant   Progress Notes      Signed   Encounter Date:  9/10/2020               Signed             Neurosurgery  Follow up        HISTORY OF PRESENT ILLNESS:Arabella Marquez is a 63 ye here for neurosurgical consultation for recently diagnosed Chiari malformation.   States in April and May of this year she started getting a lot of pressure in the back of her head increased pain with coughing and sneezing and Valsalva she saw her chiroprac PAST SURGICAL HISTORY:        Past Surgical History:   Procedure Laterality Date   • CATARACT Bilateral     • COLONOSCOPY, POSSIBLE BIOPSY, POSSIBLE POLYPECTOMY 47946 N/A 3/3/2017     Performed by Aubree Epstein MD at [address] [place] [address]    PHYSICAL EXAMINATION:  GENERAL:  Patient is in no acute distress. HEENT:  Normocephalic, atraumatic. Voice hoarse. Vowels becoming clear. SKIN: Warm, dry, without rashes. Posterior scar healed. Posterior palpation painful.  Compression causes light hea associated with retroflexion of the odontoid process, and mild compressive effect on the inferior  midbrain, which is mildly flattened.  \"Chiari 1.5 Malformation is defined as a cerebellar tonsillar     MRI TS 12/9/19   Mild multilevel thoracic degenerativ • Subluxation of metatarsophalangeal joint of lesser toe 9/9/2014   • Visual impairment     glasses for reading   • Vitamin D deficiency      Past Surgical History:   Procedure Laterality Date   • BRAIN SURGERY  03/16/2020    Chiari decompression   • CATAR

## 2020-10-13 NOTE — PLAN OF CARE
Problem: Impaired Activities of Daily Living  Goal: Achieve highest/safest level of independence in self care  Description: Interventions:  - Assess ability and encourage patient to participate in ADLs to maximize function  - Promote sitting position [facility]

## 2020-10-13 NOTE — PLAN OF CARE
Problem: Impaired Cognition  Goal: Patient will exhibit improved attention, thought processing and/or memory  Description: Interventions:  - Minimize distractions in the room when full attention is required  - Consider use of a daily journal to improve m

## 2020-10-13 NOTE — ANESTHESIA PREPROCEDURE EVALUATION
PRE-OP EVALUATION    Patient Name: Rosa Elena Guaman    Pre-op Diagnosis: Chiari I malformation ([facility] [address] [G93.5]  Gait abnormality [R26.9]  Cognitive change [R41.89]  Weakness of both legs [R29.898]  Vertigo [R42]  Dizziness [R42]  Cervical stenosis of spina Surgical History:   Procedure Laterality Date   • BRAIN SURGERY  03/16/2020    Chiari decompression   • CATARACT Bilateral    • COLONOSCOPY, POSSIBLE BIOPSY, POSSIBLE POLYPECTOMY 46241 N/A 3/3/2017    Performed by Cal Nj MD at [address] [facility] [address] aspiration, sore throat, airway management and, conversion to general anesthesia. Pt endorses understanding. All questions answered and concerns addressed.     Plan/risks discussed with: patient and sibling                Present on Admission:  **None**

## 2020-10-13 NOTE — CM/SW NOTE
10/13/20 1500   CM/SW Screening   Referral Source    Information Source Chart review;[facility] staff   Patient's Mental Status Alert;Oriented   Patient lives with Alone   Patient Status Prior to Admission   Independent with ADLs and Mobility Yes

## 2020-10-13 NOTE — PHYSICAL THERAPY NOTE
PHYSICAL THERAPY EVALUATION - INPATIENT     Room Number: 7270/9049-L  Evaluation Date: 10/13/2020  Type of Evaluation: Initial  Physician Order: PT Eval and Treat    Presenting Problem: s/p Lumbar drain placement 10/13  Reason for Therapy: Mobility D Alone  Drives: Yes          Prior Level of [place]art: Pt denies recent falls, reports indep with ADLs and mobility, no AD use. She works as a  from home.      SUBJECTIVE  \" I have two dogs, Nohemidru Johnson and My Little Elco\"      [address] Impairment: 46.58%   Standardized Score (AM-PAC Scale): 43.63   CMS Modifier (G-Code): CK    FUNCTIONAL ABILITY STATUS  Gait Assessment   Gait Assistance: Contact guard assist  Distance (ft): 150  Assistive Device: None     Stoop/Curb Assistance: Not teste of session/findings; All patient questions and concerns addressed; Family present    ASSESSMENT   Pt is 63year old female admitted on 10/13/2020 from home for lumbar drain trial.     In this PT evaluation, the patient presents with the following impairments

## 2020-10-13 NOTE — ANESTHESIA POSTPROCEDURE EVALUATION
[address] [facility] Patient Status:  Inpatient   Age/Gender 63year old female MRN [id number]   [facility] 6NE-A Attending Fly Pacheco MD   Hosp Day # 0 PCP Rupali London MD       Anesthesia Post-op Note    Procedu

## 2020-10-13 NOTE — CONSULTS
NEUROCRITICAL CARE CONSULTATION NOTE    Sara Carballo Patient Status:  Inpatient    1960 MRN [id number]   [facility] [place] 6NE-A Attending Katherine Goodrich MD   Hosp Day # 0 PCP Fercho Garcia MD     Chief Complaint/Reason for [facility] Intake 200 ml   Output 10 ml   Net 190 ml     Resp:  No active issues  - Aspiration precuations  - nebs prn    ID:  No active infections  WBC:   Recent Labs   Lab 10/10/20  0901   WBC 9.7   Antibiotics: not indicated    Heme/Onc:  No active issues  - Mon Used    Alcohol use: Not Currently      Alcohol/week: 0.0 - 1.0 standard drinks      Frequency: Monthly or less      Drinks per session: 1 or 2      Binge frequency: Never      Comment: socially    Drug use: Never    Family History:  Family History   Probl 0.9%, PEG 3350, bisacodyl, Fleet Enema, ondansetron HCl, cyclobenzaprine, diphenhydrAMINE **OR** diphenhydrAMINE HCl, acetaminophen **OR** HYDROcodone-acetaminophen **OR** HYDROcodone-acetaminophen    Physical Examination:  Patient Vitals for the past 8 hr was critical. I provided critical care services including: Interpreting diagnostic studies; Medication orders and management; Vital sign assessment monitoring and review;  Re-evaluation; Obtaining additional history from family / physician / EMS; Nadege Abernathy

## 2020-10-14 NOTE — PROGRESS NOTES
Treading CSF drainage from lumbar drain. Best results to get 10 ml/hr. While the pt was laying flat EVD-drainage system zero at EAM, and 6-8 cmH20. ICP 3, Flat or sitting up in bed.

## 2020-10-14 NOTE — PLAN OF CARE
Problem: Impaired Activities of Daily Living  Goal: Achieve highest/safest level of independence in self care  Description: Interventions:  - Assess ability and encourage patient to participate in ADLs to maximize function  - Promote sitting position [facility]

## 2020-10-14 NOTE — PROGRESS NOTES
NEUROCRITICAL CARE PROGRESS NOTE    Salena Deal Patient Status:  Inpatient    1960 MRN [id number]   [facility] 6NE-A Attending Saida See MD   Hosp Day # 1 PCP Linda Pickett MD     Chief Complaint/Reason for Admissio need  shunt   - Drain prophylaxis with vancomycin  -Bedrest for 6 to 8 hours  - Fall/seizure precautions  - Monitor strict Is/Os  - prn pain/nausea control  - Physical and occupational therapy consultation  - Rehab consultation  - IPC for VTE prevention ASTHMA  Sulfa Antibiotics       Dyspnea  Corn                    OTHER (SEE COMMENTS)    Comment:lightheadedness  Egg                     OTHER (SEE COMMENTS)    Comment:lightheadedness  Influenza Vaccines          Comment:EGG ALLERGY.   Huey Arredondo cyanosis/clubbing/edema  Pulses: Intact  Neurological Examination  Awake, alert, oriented x 3. No dysarthria or dysphasia. Visual fields are full. Pupils equal (~ 3mm OD, 3mm OS) and reactive OU. Extraocular motility is intact. Face is symmetric.  Normal  fa

## 2020-10-14 NOTE — OCCUPATIONAL THERAPY NOTE
OCCUPATIONAL THERAPY TREATMENT NOTE - INPATIENT     Room Number: 1406/5134-A  Session: 1   Number of Visits to Meet Established Goals: 5    Presenting Problem: lumbar drain trial 10/13    History related to current admission: Pt was admitted from home on 1 RESTRICTION  Weight Bearing Restriction: None                PAIN ASSESSMENT  Rating: 3  Location: headache        ACTIVITY TOLERANCE                         O2 SATURATIONS                ACTIVITIES OF DAILY LIVING ASSESSMENT  AM-PAC ‘6-Clicks’ Inpatient D 100% accuracy. Updated RN. Patient End of Session: Up in chair;Needs met;Call light within reach;RN aware of session/findings; All patient questions and concerns addressed    ASSESSMENT   Pt continues to present with intact attention, visual perception,

## 2020-10-14 NOTE — PLAN OF CARE
Received pt at 1930. Pt alert and oriented x4. Q4hr neuro checks, neurologically intact besides intermittent headache that resolves with repositioning and tinnitus. Pt on RA, lungs clear. Pt in NSR, VSS. Pt up to bathroom, good urine output.  Lumbar drain i

## 2020-10-14 NOTE — PROGRESS NOTES
[facility]  Neurosurgery Progress Note    Jonh Mancia Patient Status:  Inpatient    1960 MRN [id number]   [facility] 6NE-A Attending Herrera Pastrana MD   Hosp Day # 1 PCP Minerva Hameed MD     Chief Complaint:  Everett Peterson hypertension, opening pressure was 28 during lumbar drain placement      Plan:  Care discussed with Dr. Sophie Owusu on rounds  Patient will likely need  shunt  Obtain ICP while supine and sitting and adjust lumbar drain to obtain 10 cc/hr.   Head CT in AM  PT/O

## 2020-10-14 NOTE — PROGRESS NOTES
[facility] Hospitalist Progress Note                                                                   [address] [facility] Chirag  7/21/1960    SUBJECTIVE:  Pt seen and examined. Denies HA's or nausea. surgery.     Headache/Dizziness/vision changes, possible idiopathic intracranial hypertension  -s/p lumbar drain placement  -neurology and neurosurgery following  -monitor clinically     Post operative pain  -norco prn      Gerd  -no active sx     Asthma

## 2020-10-14 NOTE — PAYOR COMM NOTE
--------------  CONTINUED STAY REVIEW    Payor: Laurel Cook  #:  [id number]  Authorization Number: [id number]    Admit date: 10/13/20  Admit time: 9250    Admitting Physician: Darrell Mcmahon MD  Attending Physician:  Reji Irvin headaches, neck pain, dizziness and vision changes since her chiari surgery.     Headache/Dizziness/vision changes, possible idiopathic intracranial hypertension  -s/p lumbar drain placement  -neurology and neurosurgery following  -monitor clinically     P

## 2020-10-15 ENCOUNTER — APPOINTMENT (OUTPATIENT)
Dept: CT IMAGING | Facility: HOSPITAL | Age: 60
DRG: 092 | End: 2020-10-15
Attending: NURSE PRACTITIONER
Payer: COMMERCIAL

## 2020-10-15 PROCEDURE — 70450 CT HEAD/BRAIN W/O DYE: CPT | Performed by: NURSE PRACTITIONER

## 2020-10-15 NOTE — OCCUPATIONAL THERAPY NOTE
OCCUPATIONAL THERAPY TREATMENT NOTE - INPATIENT     Room Number: 9184/4556-Y  Session: 2   Number of Visits to Meet Established Goals: 5    Presenting Problem: lumbar drain trial 10/13    History related to current admission: Pt was admitted from home on 1 <160)    WEIGHT BEARING RESTRICTION  Weight Bearing Restriction: None                PAIN ASSESSMENT  Rating: Unable to rate  Location: L sided lateral face and mid front headache        ACTIVITY TOLERANCE                         O2 SATURATIONS light sensitivity today. Pt will be followed by OT for lumbar drain trial monitoring. OT will continue to address cognition and ADL. Recommend discharge home.     SHORT BLESSED TEST of Attention and Memory (SBT) was administered.  Patient scored 0 on this

## 2020-10-15 NOTE — PLAN OF CARE
0800 Pt A/Ox4, complex neuro assessment WDL, states mild tinnitus, states moderate L frontal headache, tylenol given with relief. Lumbar drain site CDI, order to drain 10 ml/hr. Pt worked with PT/OT.  1045 Pt states sudden flush sensation from head to toe, Assess ability and encourage patient to participate in ADLs to maximize function  - Promote sitting position while performing ADLs such as feeding, grooming, and bathing  - Educate and encourage patient/family in tolerated functional activity level and pre

## 2020-10-15 NOTE — PROGRESS NOTES
NEUROCRITICAL CARE PROGRESS NOTE    Erick Keller Patient Status:  Inpatient    1960 MRN [id number]   [facility] 6NE-A Attending Daniel De La Rosa MD   Hosp Day # 2 PCP Shanell Roque MD     Chief Complaint/Reason for Admissio 2020  Degenerative disc disease of cervical spine  - Neurosurgery following  - Neurochecks q4h  - SBP <160 mmHg, MAP >65 mmHg  - No antiplatelets/antithrombotics for now  - Lumbar drain in place, drain 10c /hr  - Likely plan for  shunt  - Drain prophylax HIVES  Cefuroxime Axetil       HIVES  Dust Mites              ASTHMA  Epinephrine             SWELLING    Comment:Swelling and redness at injection site  Mold                    ASTHMA  Sulfa Antibiotics       Dyspnea  Corn                    OTHER (SEE CO bilaterally. Pulm: CTA bilaterally  Abdomen: Soft ND/NT. BS +  Skin: No skin breakdown or ulcers. lumbar drain in place. Extremities: No clubbing, cyanosis or edema. Neurological Examination  Awake, alert, oriented x 3. No dysarthria or dysphasia. Vis

## 2020-10-15 NOTE — PROGRESS NOTES
[facility] Hospitalist Progress Note                                                                   [address] [facility] [place]  7/21/1960    SUBJECTIVE:  Pt seen and examined.   She had an episode of fee craniotomy 3/2020 presenting with headaches, neck pain, dizziness and vision changes since her chiari surgery.     Headache/Dizziness/vision changes, possible idiopathic intracranial hypertension  -s/p lumbar drain placement, may need  shunt  -neurology

## 2020-10-15 NOTE — PROGRESS NOTES
[facility]  Neurosurgery Progress Note    Luci Nolasco Patient Status:  Inpatient    1960 MRN [id number]   [facility] 6NE-A Attending Jersey Rowland MD   Hosp Day # 2 PCP Elsa Deng MD     Chief Complaint:  Gena Perez ACR ([employer]) NRDR ([address]) which includes the Dose Index   Registry. PATIENT STATED HISTORY: (As transcribed by Technologist)  Neurologic gait dysfunction.          FINDINGS:    VENTRICLES/SULCI:  Ventric

## 2020-10-15 NOTE — PLAN OF CARE
Problem: Impaired Activities of Daily Living  Goal: Achieve highest/safest level of independence in self care  Description: Interventions:  - Assess ability and encourage patient to participate in ADLs to maximize function  - Promote sitting position [facility]

## 2020-10-15 NOTE — PHYSICAL THERAPY NOTE
PHYSICAL THERAPY TREATMENT NOTE - INPATIENT    Room Number: 8301/0463-P     Session: 1  Number of Visits to Meet Established Goals: 5    Presenting Problem: s/p Lumbar drain placement 10/13    Problem List  Active Problems:    Cognitive change    History \"mild'. OBJECTIVE  Precautions: Other (Comment)(lumbar drain, SBP <160)    WEIGHT BEARING RESTRICTION  Weight Bearing Restriction: None                PAIN ASSESSMENT   Rating: (per pt \"mild\")  Location: headache  Management Techniques:  Activity prom test also showed improvements. No LOB noted throughout the tests today - pt able to safely complete all tasks. Pt ambulated to bathroom and demonstrated good standing balance while using UE to brush her teeth for 5 mins.  Pt reported dec HA at end of sessio level: independent  MET    Goal #3 Patient is able to ambulate 100 feet with assist device: none at assistance level: independent PROGRESSING      Goal #4 Patient is able to navigate one flight of stairs with rail use independently.     Goal #5     Goal #6

## 2020-10-16 ENCOUNTER — TELEPHONE (OUTPATIENT)
Dept: SURGERY | Facility: CLINIC | Age: 60
End: 2020-10-16

## 2020-10-16 DIAGNOSIS — G96.198 PSEUDOMENINGOCELE: ICD-10-CM

## 2020-10-16 DIAGNOSIS — G93.5 CHIARI I MALFORMATION (HCC): Primary | ICD-10-CM

## 2020-10-16 DIAGNOSIS — G93.2 ELEVATED INTRACRANIAL PRESSURE: ICD-10-CM

## 2020-10-16 NOTE — TELEPHONE ENCOUNTER
Called patient to discuss surgical instructions. She currently is still in house.      You are scheduled for a  shunt placement on 11/02/20 with Dr. Patricia Felix    Pre-op instructions discussed with patient and surgical packet provided:    · You will need to c time.   · If you were on blood thinners (such as Coumadin, Pradaxa, Xarelto, etc) prior to surgery that we had you stop for surgery, please make sure you get instructions about when to resume the medication before you are discharged from the hospital  · Po

## 2020-10-16 NOTE — OPERATIVE REPORT
[facility]    OPERATIVE REPORT    Patient:  Elise Andrade;  YOB: 1960     CSN:  [id number]; Medical Record Number:  [id number]    Admission Date:  10/13/2020 Operation Date:  10/13/2020    . ..........................     Operating Ph accurate opening pressure. Opening pressure was 28.5 cm H2O and was pulsatile. She was sedated and quite relaxed suggesting this was an accurate opening pressure.  CSF was crystal clear and collected in multiple tubes and sent for appropriate laboratory st

## 2020-10-16 NOTE — PLAN OF CARE
Assumed care of patient at 0730. Patient alert X 4. Neuro exam intact with exception of light sensitivity, lumbar drain intact, clamped since yesterday at 1300. Dr. Natty Meyer and PA at bedside, [address] drain around 1230.  Patient flat for one hour post. Continues

## 2020-10-16 NOTE — PAYOR COMM NOTE
--------------  CONTINUED STAY REVIEW    Payor: Laurel Cook  #:  [id number]  Authorization Number: [id number]    Admit date: 10/13/20  Admit time: 3682    Admitting Physician: Stephen Talley MD  Attending Physician:  Linnea Crews Plan:   Neuro:  Recurrent headaches concerning for idiopathic intracranial hypertension  Chiari I malformation s/p [facility] March 2020  Degenerative disc disease of cervical spine  - Neurosurgery following  - Neurochecks q4h  - SBP <160 mmHg, MAP >65 mmHg  - No

## 2020-10-16 NOTE — SLP NOTE
SPEECH/LANGUAGE/COGNITIVE EVALUATION - INPATIENT    Admission Date: 10/13/2020  Evaluation Date: 10/16/20    Reason for Referral: (lumbar drain)    ASSESSMENT & PLAN   ASSESSMENT & IMPRESSION  Patient seen for reassessment of cognitive communication as she Patient/Family Goals: to get back to normal    Interdisciplinary Communication: Discussed with RN    Patient, family and/or caregiver has been informed and has taken part in this evaluation and plan of treatment and have been advised and agree on the f

## 2020-10-16 NOTE — PROGRESS NOTES
[facility] Hospitalist Progress Note                                                                   [facility]  7/21/1960    SUBJECTIVE:  Pt seen and examined.   Lumbar drained removed, p status post suboccipital craniotomy 3/2020 presenting with headaches, neck pain, dizziness and vision changes since her chiari surgery.     Headache/Dizziness/vision changes, possible idiopathic intracranial hypertension  -s/p lumbar drain placement, may n

## 2020-10-16 NOTE — PROGRESS NOTES
NEUROCRITICAL CARE PROGRESS NOTE    Chayo Fuentes Patient Status:  Inpatient    1960 MRN [id number]   [facility] 6NE-A Attending Brendan Zuleta MD   Hosp Day # 3 PCP Paola Soriano MD     Chief Complaint/Reason for Admissio Neurologically otherwise remained stable.     Assessment & Plan:    Neuro:  Recurrent headaches concerning for idiopathic intracranial hypertension  Chiari I malformation s/p [facility] March 2020  Degenerative disc disease of cervical spine  - Neurosurgery follow Continuous Infusions:       Allergies:    Arava [Leflunomide]     DIZZINESS, Tightness in Throat  Bactrim                 Dyspnea  Birch Tar Oil           ASTHMA    Comment:Birch trees  Ceftin [Cefuroxime]     HIVES  Cefuroxime Axetil       HIVES  Dust kg)  03/17/20 : 184 lb 4.9 oz (83.6 kg)  11/20/19 : 186 lb (84.4 kg)    General: Awake, alert, oriented x 3. In NAD, resting comfortable  CVS: S1 + S2, RRR. Equal pulses bilaterally. Pulm: CTA bilaterally  Abdomen: Soft ND/NT.  BS +  Skin: No skin breakdo

## 2020-10-16 NOTE — PHYSICAL THERAPY NOTE
PHYSICAL THERAPY TREATMENT NOTE - INPATIENT    Room Number: 1951/7041-K     Session: 2  Number of Visits to Meet Established Goals: 5    Presenting Problem: s/p Lumbar drain placement 10/13    History related to current admission: Pt is 63year old female dogs.    OBJECTIVE  Precautions:  Other (Comment)(lumbar drain, SBP <160)    WEIGHT BEARING RESTRICTION  Weight Bearing Restriction: None                PAIN ASSESSMENT   Rating: Unable to rate  Location: Headache, denied during time of session, but reports 250'x1 w/o device and independently. Pt completed 4 steps w/ use of railing and mod I (did not complete more due to ongoing lumbar drain). Pt performed gait speed assessment 1.03 m/sec which is consistent/close to yesterday's reading of 1.14 m/sec.

## 2020-10-16 NOTE — PROGRESS NOTES
[facility]  Neurosurgery Progress Note    Melissa Mutpaulette Patient Status:  Inpatient    1960 MRN [id number]   [facility] 6NE-A Attending Luc Kwon MD   Hosp Day # 3 PCP Landon Zapata MD     Chief Complaint:  Shanelle Bolton Technologist)  Neurologic gait dysfunction. FINDINGS:    VENTRICLES/SULCI:  Ventricles and sulci are normal in size. INTRACRANIAL:  No significant midline shift or mass effect present. No new areas of abnormal attenuation.   There postsurgical c

## 2020-10-16 NOTE — PLAN OF CARE
Problem: Impaired Activities of Daily Living  Goal: Achieve highest/safest level of independence in self care  Description: Interventions:  - Assess ability and encourage patient to participate in ADLs to maximize function  - Promote sitting position [facility]

## 2020-10-16 NOTE — OCCUPATIONAL THERAPY NOTE
OCCUPATIONAL THERAPY TREATMENT NOTE - INPATIENT     Room Number: 5730/9940-D  Session: 3  Number of Visits to Meet Established Goals: 5    Presenting Problem: lumbar drain trial 10/13, removed 10/16    History related to current admission: Pt was admitted BEARING RESTRICTION  Weight Bearing Restriction: None                PAIN ASSESSMENT  Rating: Unable to rate  Location: L sided lateral face and mid front headache        ACTIVITY TOLERANCE                         O2 SATURATIONS                ACTIVITIES O training;Cognitive reorientation;Patient/Family education;Patient/Family training;Equipment eval/education; Compensatory technique education  Rehab Potential : Good  Frequency (Obs): 5x/week     OT Goals:      ADL Goals   Patient will perform grooming: with

## 2020-10-19 NOTE — PAYOR COMM NOTE
--------------  DISCHARGE REVIEW    Payor: Laurel Cook  #:  [id number]  Authorization Number: [id number]    Admit date: 10/13/20  Admit time:  1140 [facility]  Discharge Date: 10/16/2020  4:16 PM     Admitting Physician: Meg Lr MD  Att decompression Dr. Pat Beckford. She is getting increasing HAs. Increased neck pain and tightness with shooting pain. Since last visit she has had optical migraines. HAs began in June. Right shoulder weak. Occipital shock pain.  Seeing colors and lights with head m  She complains of dizziness, vertigo, lightheadedness, unsteadiness with walking.  She is fallen on 3 occasions.  Her occipital headache radiates to her right temple.  She has decreased concentration.  She feels her throat tightens when she has an episode Department [facility])    Oct 29, 2020 11:00 AM CDT Post Op Visit with Eligio Fu MD [facility], [address])        Nov 09, 2020  9:30 AM CST Follow Up Visit with Pamella Fischer MD Otolaryngol

## 2020-10-19 NOTE — DISCHARGE SUMMARY
[facility]  Discharge Summary    Sandy Host Patient Status:  Inpatient    1960 MRN [id number]   [facility] 6NE-A Attending No att. providers found   Hosp Day # 3 PCP Amanda Bailey MD     Date of Admission: 10/13/2020 endotracheal tube. She has been at home. Gained weight. Right shoulder weak and tight. Leg spasms resolved. Shocks to hands resolved. Vision improved. Hearing better. Chest fluttering and TS band resolved. ST memory and multitasking has declined.  No HA.    rounds of steroids 40 mg of prednisone seems to help but does not last.  She had a 30 pound weight gain over the last several months.  She denies any night sweats night pain. Brief Summary of Hospital Course: Pt presented for procedure as planned.   Intr prolonged immobility  Wound Care: Ok to shower, do not scrub incision. Call with questions or concerns regarding wound    Other Discharge Instructions:  F/u as scheduled.   Call or go to ED should you develop fever, shortness of breath, chest pain, or leg

## 2020-10-20 NOTE — TELEPHONE ENCOUNTER
Patient called. She states no history of stroke or heart attack. She does not take any aspirin or blood thinner she says. She does not have any chest pain or shortness of breath at rest or on exertion. She does not see any other specialists.   She does

## 2020-10-21 NOTE — TELEPHONE ENCOUNTER
Spoke with Liliana Mclean at [address]  Dept [phone]  Time:16:50    Prior authorization request completed for : RIGHT OCCIPITAL VENTRICULAR PERITONEAL SHUNT INSERTION    Authorization #[id number]    Authorization dates: 11/04/20 - 11/05/20  CPT codes approve

## 2020-10-23 ENCOUNTER — TELEPHONE (OUTPATIENT)
Dept: SURGERY | Facility: CLINIC | Age: 60
End: 2020-10-23

## 2020-10-23 PROBLEM — E66.9 OBESITY (BMI 35.0-39.9 WITHOUT COMORBIDITY): Status: ACTIVE | Noted: 2020-10-23

## 2020-10-23 PROBLEM — G93.2 INTRACRANIAL PRESSURE INCREASED: Status: ACTIVE | Noted: 2020-10-23

## 2020-10-23 PROBLEM — K21.9 GASTROESOPHAGEAL REFLUX DISEASE, UNSPECIFIED WHETHER ESOPHAGITIS PRESENT: Status: ACTIVE | Noted: 2020-10-23

## 2020-10-23 RX ORDER — ACETAMINOPHEN 500 MG
1000 TABLET ORAL ONCE
Status: CANCELLED | OUTPATIENT
Start: 2020-10-23 | End: 2020-10-23

## 2020-10-23 NOTE — TELEPHONE ENCOUNTER
[facility] and updated date of surgery for the Lumbar Drain [place] [address] to 10/13/2020 with Joseph Barry

## 2020-10-23 NOTE — TELEPHONE ENCOUNTER
Per PCP- on 10/23/2020: \"59,F who presents for a pre-operative physical exam. Patient is to have [address] to be done by Dr. Walter Urbina at [facility] on  11/2/20       Rheumatoid arthritis involving multiple s

## 2020-10-27 NOTE — TELEPHONE ENCOUNTER
Spoke with patient who stated PCP stated she was cleared for procedure and did not mention anything about needing cardiac clearance. Informed patient I would reach out to her PCP office to check status of the above.     Spoke with PCP office who stated t

## 2020-10-29 ENCOUNTER — TELEPHONE (OUTPATIENT)
Dept: SURGERY | Facility: CLINIC | Age: 60
End: 2020-10-29

## 2020-10-29 ENCOUNTER — OFFICE VISIT (OUTPATIENT)
Dept: SURGERY | Facility: CLINIC | Age: 60
End: 2020-10-29
Payer: COMMERCIAL

## 2020-10-29 VITALS
SYSTOLIC BLOOD PRESSURE: 126 MMHG | HEIGHT: 58 IN | BODY MASS INDEX: 35.68 KG/M2 | OXYGEN SATURATION: 98 % | WEIGHT: 170 LBS | RESPIRATION RATE: 16 BRPM | HEART RATE: 65 BPM | DIASTOLIC BLOOD PRESSURE: 70 MMHG

## 2020-10-29 DIAGNOSIS — G93.2 ELEVATED INTRACRANIAL PRESSURE: ICD-10-CM

## 2020-10-29 DIAGNOSIS — G93.5 CHIARI I MALFORMATION (HCC): Primary | ICD-10-CM

## 2020-10-29 DIAGNOSIS — G96.198 PSEUDOMENINGOCELE: ICD-10-CM

## 2020-10-29 PROCEDURE — 99214 OFFICE O/P EST MOD 30 MIN: CPT | Performed by: PHYSICIAN ASSISTANT

## 2020-10-29 PROCEDURE — 3008F BODY MASS INDEX DOCD: CPT | Performed by: PHYSICIAN ASSISTANT

## 2020-10-29 PROCEDURE — 3078F DIAST BP <80 MM HG: CPT | Performed by: PHYSICIAN ASSISTANT

## 2020-10-29 PROCEDURE — 3074F SYST BP LT 130 MM HG: CPT | Performed by: PHYSICIAN ASSISTANT

## 2020-10-29 NOTE — PROGRESS NOTES
Neurosurgery  Follow up      [address] is a 63year old RH female here in follow up after 3/16/2020 Chiari decompression Dr. Shira Oakes. Memory and comprehension are better. Brain fog resolved.  Since going home she has develo a lot of pressure in the back of her head increased pain with coughing and sneezing and Valsalva she saw her chiropractor and acupuncturist in the try to treat her pain and pressure.   She got by for a while she tried muscle relaxers which gave her some rel Date   • BRAIN SURGERY  03/16/2020    Chiari decompression   • CATARACT Bilateral    • COLONOSCOPY, POSSIBLE BIOPSY, POSSIBLE POLYPECTOMY [address] N/A 3/3/2017    Performed by Franci Metzger MD at [address] [place] [address] (650 mg total) by mouth every 4 (four) hours as needed. , Disp: 30 tablet, Rfl: 0    No current facility-administered medications on file prior to visit. REVIEW OF SYSTEMS:  A 10-point system was reviewed.   Pertinent positives and negatives are noted She has cervical spondylosis at C4-5 C5-6 and C6-7    MRI brain 12/9/19  there is a Chiari 1.5 malformation, with peglike cerebellar  tonsils, measuring 13 mm below the foramen magnum, including medulla bulb and obex herniation,  associated with retroflexi

## 2020-10-29 NOTE — TELEPHONE ENCOUNTER
Per pt request, faxed signed condition update to [facility] @ [phone]. Confirmation received, Sent to scan.

## 2020-10-29 NOTE — PATIENT INSTRUCTIONS
Refill policies:    • Allow 2-3 business days for refills; controlled substances may take longer.   • Contact your pharmacy at least 5 days prior to running out of medication and have them send an electronic request or submit request through the “request re Depending on your insurance carrier, approval may take 3-10 days. It is highly recommended patients contact their insurance carrier directly to determine coverage.   If test is done without insurance authorization, patient may be responsible for the entire programmable   -FU ENT  -FMLA docs completed off 11/4/2020-11/14/2020.  Intermittent leave completed for 4-5x per month, 1-2 days per event

## 2020-11-01 ENCOUNTER — APPOINTMENT (OUTPATIENT)
Dept: LAB | Facility: HOSPITAL | Age: 60
End: 2020-11-01
Attending: NEUROLOGICAL SURGERY
Payer: COMMERCIAL

## 2020-11-01 DIAGNOSIS — G93.5 CHIARI I MALFORMATION (HCC): ICD-10-CM

## 2020-11-04 ENCOUNTER — ANESTHESIA EVENT (OUTPATIENT)
Dept: SURGERY | Facility: HOSPITAL | Age: 60
DRG: 031 | End: 2020-11-04
Payer: COMMERCIAL

## 2020-11-04 ENCOUNTER — APPOINTMENT (OUTPATIENT)
Dept: GENERAL RADIOLOGY | Facility: HOSPITAL | Age: 60
DRG: 031 | End: 2020-11-04
Attending: PHYSICIAN ASSISTANT
Payer: COMMERCIAL

## 2020-11-04 ENCOUNTER — ANESTHESIA (OUTPATIENT)
Dept: SURGERY | Facility: HOSPITAL | Age: 60
DRG: 031 | End: 2020-11-04
Payer: COMMERCIAL

## 2020-11-04 ENCOUNTER — APPOINTMENT (OUTPATIENT)
Dept: CT IMAGING | Facility: HOSPITAL | Age: 60
DRG: 031 | End: 2020-11-04
Attending: PHYSICIAN ASSISTANT
Payer: COMMERCIAL

## 2020-11-04 ENCOUNTER — HOSPITAL ENCOUNTER (INPATIENT)
Facility: HOSPITAL | Age: 60
LOS: 3 days | Discharge: HOME | DRG: 031 | End: 2020-11-07
Attending: NEUROLOGICAL SURGERY | Admitting: NEUROLOGICAL SURGERY
Payer: COMMERCIAL

## 2020-11-04 DIAGNOSIS — G93.5 CHIARI I MALFORMATION (HCC): Primary | ICD-10-CM

## 2020-11-04 DIAGNOSIS — G96.198 PSEUDOMENINGOCELE: ICD-10-CM

## 2020-11-04 DIAGNOSIS — G93.2 ELEVATED INTRACRANIAL PRESSURE: ICD-10-CM

## 2020-11-04 PROCEDURE — 3080F DIAST BP >= 90 MM HG: CPT | Performed by: INTERNAL MEDICINE

## 2020-11-04 PROCEDURE — 00160J6 BYPASS CEREBRAL VENTRICLE TO PERITONEAL CAVITY WITH SYNTHETIC SUBSTITUTE, OPEN APPROACH: ICD-10-PCS | Performed by: NEUROLOGICAL SURGERY

## 2020-11-04 PROCEDURE — 8E09XBZ COMPUTER ASSISTED PROCEDURE OF HEAD AND NECK REGION: ICD-10-PCS | Performed by: NEUROLOGICAL SURGERY

## 2020-11-04 PROCEDURE — 70450 CT HEAD/BRAIN W/O DYE: CPT | Performed by: PHYSICIAN ASSISTANT

## 2020-11-04 PROCEDURE — 3008F BODY MASS INDEX DOCD: CPT | Performed by: INTERNAL MEDICINE

## 2020-11-04 PROCEDURE — 3077F SYST BP >= 140 MM HG: CPT | Performed by: INTERNAL MEDICINE

## 2020-11-04 PROCEDURE — 74019 RADEX ABDOMEN 2 VIEWS: CPT | Performed by: PHYSICIAN ASSISTANT

## 2020-11-04 PROCEDURE — 99291 CRITICAL CARE FIRST HOUR: CPT | Performed by: INTERNAL MEDICINE

## 2020-11-04 PROCEDURE — S0028 INJECTION, FAMOTIDINE, 20 MG: HCPCS | Performed by: ANESTHESIOLOGY

## 2020-11-04 DEVICE — VALVE 42866 FP-STRATA 2 REGULAR
Type: IMPLANTABLE DEVICE | Site: HEAD | Status: FUNCTIONAL
Brand: STRATA®

## 2020-11-04 DEVICE — CODMAN® BACTISEAL® DISTAL CATHETER KIT WITH BACTISEAL SHUNT SYSTEM CONTENTS: DISTAL CATHETER (1), INFORMATION MANUAL (1)
Type: IMPLANTABLE DEVICE | Site: HEAD | Status: FUNCTIONAL
Brand: CODMAN® BACTISEAL®

## 2020-11-04 RX ORDER — ACETAMINOPHEN 500 MG
500 TABLET ORAL EVERY 6 HOURS PRN
Status: DISCONTINUED | OUTPATIENT
Start: 2020-11-04 | End: 2020-11-07

## 2020-11-04 RX ORDER — SODIUM CHLORIDE, SODIUM LACTATE, POTASSIUM CHLORIDE, CALCIUM CHLORIDE 600; 310; 30; 20 MG/100ML; MG/100ML; MG/100ML; MG/100ML
INJECTION, SOLUTION INTRAVENOUS CONTINUOUS
Status: DISCONTINUED | OUTPATIENT
Start: 2020-11-04 | End: 2020-11-04 | Stop reason: HOSPADM

## 2020-11-04 RX ORDER — FAMOTIDINE 20 MG/1
20 TABLET ORAL 2 TIMES DAILY
Status: COMPLETED | OUTPATIENT
Start: 2020-11-04 | End: 2020-11-04

## 2020-11-04 RX ORDER — FAMOTIDINE 10 MG/ML
INJECTION, SOLUTION INTRAVENOUS AS NEEDED
Status: DISCONTINUED | OUTPATIENT
Start: 2020-11-04 | End: 2020-11-04 | Stop reason: SURG

## 2020-11-04 RX ORDER — LIDOCAINE HYDROCHLORIDE 10 MG/ML
INJECTION, SOLUTION EPIDURAL; INFILTRATION; INTRACAUDAL; PERINEURAL AS NEEDED
Status: DISCONTINUED | OUTPATIENT
Start: 2020-11-04 | End: 2020-11-04 | Stop reason: SURG

## 2020-11-04 RX ORDER — HYDROCODONE BITARTRATE AND ACETAMINOPHEN 5; 325 MG/1; MG/1
2 TABLET ORAL EVERY 4 HOURS PRN
Status: DISCONTINUED | OUTPATIENT
Start: 2020-11-04 | End: 2020-11-07

## 2020-11-04 RX ORDER — PANTOPRAZOLE SODIUM 20 MG/1
20 TABLET, DELAYED RELEASE ORAL
Status: DISCONTINUED | OUTPATIENT
Start: 2020-11-05 | End: 2020-11-07

## 2020-11-04 RX ORDER — ROCURONIUM BROMIDE 10 MG/ML
INJECTION, SOLUTION INTRAVENOUS AS NEEDED
Status: DISCONTINUED | OUTPATIENT
Start: 2020-11-04 | End: 2020-11-04 | Stop reason: SURG

## 2020-11-04 RX ORDER — LABETALOL HYDROCHLORIDE 5 MG/ML
10 INJECTION, SOLUTION INTRAVENOUS AS NEEDED
Status: DISCONTINUED | OUTPATIENT
Start: 2020-11-04 | End: 2020-11-07

## 2020-11-04 RX ORDER — METOCLOPRAMIDE HYDROCHLORIDE 5 MG/ML
10 INJECTION INTRAMUSCULAR; INTRAVENOUS ONCE AS NEEDED
Status: DISCONTINUED | OUTPATIENT
Start: 2020-11-04 | End: 2020-11-04 | Stop reason: HOSPADM

## 2020-11-04 RX ORDER — SODIUM CHLORIDE 9 MG/ML
INJECTION, SOLUTION INTRAVENOUS CONTINUOUS PRN
Status: DISCONTINUED | OUTPATIENT
Start: 2020-11-04 | End: 2020-11-04 | Stop reason: SURG

## 2020-11-04 RX ORDER — BUPIVACAINE HYDROCHLORIDE 5 MG/ML
INJECTION, SOLUTION EPIDURAL; INTRACAUDAL AS NEEDED
Status: DISCONTINUED | OUTPATIENT
Start: 2020-11-04 | End: 2020-11-04 | Stop reason: HOSPADM

## 2020-11-04 RX ORDER — HYDROMORPHONE HYDROCHLORIDE 1 MG/ML
0.4 INJECTION, SOLUTION INTRAMUSCULAR; INTRAVENOUS; SUBCUTANEOUS EVERY 5 MIN PRN
Status: DISCONTINUED | OUTPATIENT
Start: 2020-11-04 | End: 2020-11-04 | Stop reason: HOSPADM

## 2020-11-04 RX ORDER — ACETAMINOPHEN 500 MG
1000 TABLET ORAL EVERY 6 HOURS PRN
Status: DISCONTINUED | OUTPATIENT
Start: 2020-11-04 | End: 2020-11-07

## 2020-11-04 RX ORDER — HYDRALAZINE HYDROCHLORIDE 20 MG/ML
10 INJECTION INTRAMUSCULAR; INTRAVENOUS
Status: DISCONTINUED | OUTPATIENT
Start: 2020-11-04 | End: 2020-11-07

## 2020-11-04 RX ORDER — VANCOMYCIN HYDROCHLORIDE
15 ONCE
Status: COMPLETED | OUTPATIENT
Start: 2020-11-04 | End: 2020-11-04

## 2020-11-04 RX ORDER — HYDROCODONE BITARTRATE AND ACETAMINOPHEN 5; 325 MG/1; MG/1
1 TABLET ORAL EVERY 4 HOURS PRN
Status: DISCONTINUED | OUTPATIENT
Start: 2020-11-04 | End: 2020-11-07

## 2020-11-04 RX ORDER — NALOXONE HYDROCHLORIDE 0.4 MG/ML
80 INJECTION, SOLUTION INTRAMUSCULAR; INTRAVENOUS; SUBCUTANEOUS AS NEEDED
Status: DISCONTINUED | OUTPATIENT
Start: 2020-11-04 | End: 2020-11-04 | Stop reason: HOSPADM

## 2020-11-04 RX ORDER — MORPHINE SULFATE 2 MG/ML
1 INJECTION, SOLUTION INTRAMUSCULAR; INTRAVENOUS EVERY 2 HOUR PRN
Status: DISCONTINUED | OUTPATIENT
Start: 2020-11-04 | End: 2020-11-07

## 2020-11-04 RX ORDER — ONDANSETRON 2 MG/ML
4 INJECTION INTRAMUSCULAR; INTRAVENOUS EVERY 6 HOURS PRN
Status: DISCONTINUED | OUTPATIENT
Start: 2020-11-04 | End: 2020-11-07

## 2020-11-04 RX ORDER — DOCUSATE SODIUM 100 MG/1
100 CAPSULE, LIQUID FILLED ORAL 2 TIMES DAILY
Status: DISCONTINUED | OUTPATIENT
Start: 2020-11-04 | End: 2020-11-07

## 2020-11-04 RX ORDER — DEXAMETHASONE SODIUM PHOSPHATE 4 MG/ML
VIAL (ML) INJECTION AS NEEDED
Status: DISCONTINUED | OUTPATIENT
Start: 2020-11-04 | End: 2020-11-04 | Stop reason: SURG

## 2020-11-04 RX ORDER — ONDANSETRON 2 MG/ML
4 INJECTION INTRAMUSCULAR; INTRAVENOUS ONCE AS NEEDED
Status: DISCONTINUED | OUTPATIENT
Start: 2020-11-04 | End: 2020-11-04 | Stop reason: HOSPADM

## 2020-11-04 RX ORDER — LIDOCAINE HYDROCHLORIDE 40 MG/ML
SOLUTION TOPICAL AS NEEDED
Status: DISCONTINUED | OUTPATIENT
Start: 2020-11-04 | End: 2020-11-04 | Stop reason: SURG

## 2020-11-04 RX ORDER — MORPHINE SULFATE 4 MG/ML
4 INJECTION, SOLUTION INTRAMUSCULAR; INTRAVENOUS EVERY 2 HOUR PRN
Status: DISCONTINUED | OUTPATIENT
Start: 2020-11-04 | End: 2020-11-07

## 2020-11-04 RX ORDER — BACITRACIN 50000 [USP'U]/1
INJECTION, POWDER, LYOPHILIZED, FOR SOLUTION INTRAMUSCULAR AS NEEDED
Status: DISCONTINUED | OUTPATIENT
Start: 2020-11-04 | End: 2020-11-04 | Stop reason: HOSPADM

## 2020-11-04 RX ORDER — FAMOTIDINE 10 MG/ML
20 INJECTION, SOLUTION INTRAVENOUS 2 TIMES DAILY
Status: COMPLETED | OUTPATIENT
Start: 2020-11-04 | End: 2020-11-04

## 2020-11-04 RX ORDER — MORPHINE SULFATE 2 MG/ML
2 INJECTION, SOLUTION INTRAMUSCULAR; INTRAVENOUS EVERY 2 HOUR PRN
Status: DISCONTINUED | OUTPATIENT
Start: 2020-11-04 | End: 2020-11-07

## 2020-11-04 RX ORDER — HYDROMORPHONE HYDROCHLORIDE 1 MG/ML
INJECTION, SOLUTION INTRAMUSCULAR; INTRAVENOUS; SUBCUTANEOUS
Status: COMPLETED
Start: 2020-11-04 | End: 2020-11-04

## 2020-11-04 RX ORDER — METOCLOPRAMIDE HYDROCHLORIDE 5 MG/ML
INJECTION INTRAMUSCULAR; INTRAVENOUS AS NEEDED
Status: DISCONTINUED | OUTPATIENT
Start: 2020-11-04 | End: 2020-11-04 | Stop reason: SURG

## 2020-11-04 RX ORDER — ONDANSETRON 2 MG/ML
INJECTION INTRAMUSCULAR; INTRAVENOUS AS NEEDED
Status: DISCONTINUED | OUTPATIENT
Start: 2020-11-04 | End: 2020-11-04 | Stop reason: SURG

## 2020-11-04 RX ORDER — SODIUM CHLORIDE AND POTASSIUM CHLORIDE .9; .15 G/100ML; G/100ML
SOLUTION INTRAVENOUS CONTINUOUS
Status: DISCONTINUED | OUTPATIENT
Start: 2020-11-04 | End: 2020-11-07

## 2020-11-04 RX ORDER — PHENYLEPHRINE HCL 10 MG/ML
VIAL (ML) INJECTION AS NEEDED
Status: DISCONTINUED | OUTPATIENT
Start: 2020-11-04 | End: 2020-11-04 | Stop reason: SURG

## 2020-11-04 RX ADMIN — SODIUM CHLORIDE: 9 INJECTION, SOLUTION INTRAVENOUS at 08:56:00

## 2020-11-04 RX ADMIN — LIDOCAINE HYDROCHLORIDE 4 ML: 40 SOLUTION TOPICAL at 07:47:00

## 2020-11-04 RX ADMIN — FAMOTIDINE 20 MG: 10 INJECTION, SOLUTION INTRAVENOUS at 07:54:00

## 2020-11-04 RX ADMIN — PHENYLEPHRINE HCL 100 MCG: 10 MG/ML VIAL (ML) INJECTION at 08:22:00

## 2020-11-04 RX ADMIN — ONDANSETRON 4 MG: 2 INJECTION INTRAMUSCULAR; INTRAVENOUS at 07:42:00

## 2020-11-04 RX ADMIN — ROCURONIUM BROMIDE 50 MG: 10 INJECTION, SOLUTION INTRAVENOUS at 07:44:00

## 2020-11-04 RX ADMIN — SODIUM CHLORIDE, SODIUM LACTATE, POTASSIUM CHLORIDE, CALCIUM CHLORIDE: 600; 310; 30; 20 INJECTION, SOLUTION INTRAVENOUS at 10:11:00

## 2020-11-04 RX ADMIN — LIDOCAINE HYDROCHLORIDE 50 MG: 10 INJECTION, SOLUTION EPIDURAL; INFILTRATION; INTRACAUDAL; PERINEURAL at 07:44:00

## 2020-11-04 RX ADMIN — PHENYLEPHRINE HCL 100 MCG: 10 MG/ML VIAL (ML) INJECTION at 08:01:00

## 2020-11-04 RX ADMIN — SODIUM CHLORIDE: 9 INJECTION, SOLUTION INTRAVENOUS at 10:11:00

## 2020-11-04 RX ADMIN — DEXAMETHASONE SODIUM PHOSPHATE 10 MG: 4 MG/ML VIAL (ML) INJECTION at 08:38:00

## 2020-11-04 RX ADMIN — ROCURONIUM BROMIDE 20 MG: 10 INJECTION, SOLUTION INTRAVENOUS at 08:40:00

## 2020-11-04 RX ADMIN — METOCLOPRAMIDE HYDROCHLORIDE 20 MG: 5 INJECTION INTRAMUSCULAR; INTRAVENOUS at 09:02:00

## 2020-11-04 NOTE — BRIEF OP NOTE
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

## 2020-11-04 NOTE — ANESTHESIA PROCEDURE NOTES
Arterial Line  Performed by: Brittaney Silva MD  Authorized by:  Brittaney Silva MD     General Information and Staff    Procedure Start:  11/4/2020 7:51 AM  Procedure End:  11/4/2020 7:54 AM  Anesthesiologist:  Brittaney Silva MD  Performed By:  Anesthesiologist

## 2020-11-04 NOTE — CONSULTS
[facility] [place]  Neurocritical Care Consult Note    Lesa Camacho Patient Status:  Inpatient    1960 MRN [id number]   Location [facility] Attending Merlyn Rice MD   Hosp Day # 0 PCP Donnie Shirley • CRANIECTOMY FOR CHIARI MALFORMATION N/A 3/16/2020    Performed by Karyl Bence, MD at [facility] MAIN OR   • [address] N/A 10/13/2020    Performed by Karyl Bence, MD at [facility] MAIN OR   • PART EDENT IMPL/FIXED DENT      dental implant   • SIN activity: Yes        Partners: Male    Other Topics      Concerns:        Exercise: Yes        Seat Belt: Yes    Family History   Problem Relation Age of Onset   • Heart Disorder Father    • Hypertension Father    • Musculo-skelatal Disorder Father 1280 ml       Physical Examination:   General- No acute distress  CV- RRR  Resp- CTA Bilat  Neuro-  · Mental status- awake and alert, regards and follows commands, oriented x3, speech fluent  · Cranial nerves- pupils equally round and reactive to light, ex

## 2020-11-04 NOTE — CONSULTS
[facility] Hospitalist Consult     CC: post op    Consult: medical management     PCP: Ketty Marino MD      Assessment and Plan     Colleen Galvan is a 63year old female with PMH sig for GERD, asthma, RA, chiari malformation s/p decompression and dur plate injury 4/4/9392   • Rheumatoid arthritis ([facility] [address]    • Subluxation of metatarsophalangeal joint of lesser toe 9/9/2014   • Visual impairment     glasses for reading   • Vitamin D deficiency         [facility]  Past Surgical History:   Procedure Laterality Date session: 1 or 2      Binge frequency: Never      Comment: socially       Fam Hx  Family History   Problem Relation Age of Onset   • Heart Disorder Father    • Hypertension Father    • Musculo-skelatal Disorder Father         osteoporosis   • Allergies Fath Registry. PATIENT STATED HISTORY: (As transcribed by Technologist)  Neurologic gait dysfunction. FINDINGS:  VENTRICLES/SULCI:  Ventricles and sulci are normal in size. INTRACRANIAL:  No significant midline shift or mass effect present.   No new areas o

## 2020-11-04 NOTE — OPERATIVE REPORT
[facility]    OPERATIVE REPORT    Patient:  Sara Carballo;  YOB: 1960     CSN:  [id number]; Medical Record Number:  [id number]    Admission Date:  11/4/2020 Operation Date:  11/4/2020    . ..........................     Operating Phys supine on the operative table and placed under general endotracheal anesthesia.  Sequential compression boots were applied and a gel pad roll was placed under the right torso to turn the head to the left to expose the right occipital area without excessivel anterior rectus sheath. The shunt passer was tunneled from the abdominal to the occipital incision, the peritoneal catheter was deployed, and the shunt passer withdrawn.  The Medtronic strata valve set at 2.0 was secured to the proximal end of the peritonea suture in the anterior rectus sheath, and 2-0 and 3-0 Vicryl suture in the subcutaneous tissue. The skin was closed with 3-0 Vicryl Rapide and Dermabond. A coverlet dressing applied. The cranial incision was closed with 2-0 interrupted Vicryl in the galea

## 2020-11-04 NOTE — ANESTHESIA PROCEDURE NOTES
Peripheral IV  Inserted by:  Dora Early MD    Placement  Needle size: 20 G  Laterality: left  Location: hand  Local anesthetic: none  Site prep: alcohol  Technique: anatomical landmarks  Attempts: 2

## 2020-11-04 NOTE — ANESTHESIA PROCEDURE NOTES
Airway  Urgency: elective    Airway not difficult    General Information and Staff    Patient location during procedure: OR  Anesthesiologist: Esdras Tovar MD  Performed: anesthesiologist     Indications and Patient Condition  Indications for airway manage

## 2020-11-04 NOTE — ANESTHESIA POSTPROCEDURE EVALUATION
[facility] Patient Status:  Inpatient   Age/Gender 63year old female MRN [id number]   [facility] SURGERY Attending Emmanuel Chaudhary MD   Hosp Day # 0 PCP Sarbjit Royal MD       Anesthesia Post-op Note    Proce recovery expectations with chance for questions was provided to a responsible recovery RN. Report to JUAN CARLOS Webber.       Dental Exam: Unchanged from Preop

## 2020-11-04 NOTE — ANESTHESIA PREPROCEDURE EVALUATION
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 No notable dental history. Pulmonary  Comment: Unlabored ventilatory effort, no retractions. Pulmonary exam normal.                 Other findings            ASA: 3   Plan: general  NPO status verified and patient meets guidelines.

## 2020-11-04 NOTE — INTERVAL H&P NOTE
Pre-op Diagnosis: Chiari I malformation (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

## 2020-11-05 ENCOUNTER — ANESTHESIA (OUTPATIENT)
Dept: SURGERY | Facility: HOSPITAL | Age: 60
DRG: 031 | End: 2020-11-05
Payer: COMMERCIAL

## 2020-11-05 ENCOUNTER — ANESTHESIA EVENT (OUTPATIENT)
Dept: SURGERY | Facility: HOSPITAL | Age: 60
DRG: 031 | End: 2020-11-05
Payer: COMMERCIAL

## 2020-11-05 ENCOUNTER — APPOINTMENT (OUTPATIENT)
Dept: GENERAL RADIOLOGY | Facility: HOSPITAL | Age: 60
DRG: 031 | End: 2020-11-05
Attending: NEUROLOGICAL SURGERY
Payer: COMMERCIAL

## 2020-11-05 PROCEDURE — 99291 CRITICAL CARE FIRST HOUR: CPT | Performed by: INTERNAL MEDICINE

## 2020-11-05 PROCEDURE — 00W60JZ REVISION OF SYNTHETIC SUBSTITUTE IN CEREBRAL VENTRICLE, OPEN APPROACH: ICD-10-PCS | Performed by: NEUROLOGICAL SURGERY

## 2020-11-05 PROCEDURE — 76000 FLUOROSCOPY <1 HR PHYS/QHP: CPT | Performed by: NEUROLOGICAL SURGERY

## 2020-11-05 PROCEDURE — 70250 X-RAY EXAM OF SKULL: CPT | Performed by: NEUROLOGICAL SURGERY

## 2020-11-05 PROCEDURE — S0028 INJECTION, FAMOTIDINE, 20 MG: HCPCS | Performed by: INTERNAL MEDICINE

## 2020-11-05 DEVICE — CODMAN® BACTISEAL® VENTRICULAR CATHETER WITH BACTISEAL SHUNT SYSTEM
Type: IMPLANTABLE DEVICE | Site: HEAD | Status: FUNCTIONAL
Brand: CODMAN® BACTISEAL®

## 2020-11-05 RX ORDER — PHENYLEPHRINE HCL 10 MG/ML
VIAL (ML) INJECTION AS NEEDED
Status: DISCONTINUED | OUTPATIENT
Start: 2020-11-05 | End: 2020-11-05 | Stop reason: SURG

## 2020-11-05 RX ORDER — ONDANSETRON 2 MG/ML
4 INJECTION INTRAMUSCULAR; INTRAVENOUS AS NEEDED
Status: CANCELLED | OUTPATIENT
Start: 2020-11-05 | End: 2020-11-06

## 2020-11-05 RX ORDER — VANCOMYCIN HYDROCHLORIDE
15 ONCE
Status: COMPLETED | OUTPATIENT
Start: 2020-11-05 | End: 2020-11-05

## 2020-11-05 RX ORDER — SODIUM CHLORIDE, SODIUM LACTATE, POTASSIUM CHLORIDE, CALCIUM CHLORIDE 600; 310; 30; 20 MG/100ML; MG/100ML; MG/100ML; MG/100ML
INJECTION, SOLUTION INTRAVENOUS CONTINUOUS
Status: DISCONTINUED | OUTPATIENT
Start: 2020-11-05 | End: 2020-11-05

## 2020-11-05 RX ORDER — METOCLOPRAMIDE HYDROCHLORIDE 5 MG/ML
INJECTION INTRAMUSCULAR; INTRAVENOUS AS NEEDED
Status: DISCONTINUED | OUTPATIENT
Start: 2020-11-05 | End: 2020-11-05 | Stop reason: SURG

## 2020-11-05 RX ORDER — SODIUM CHLORIDE 9 MG/ML
INJECTION, SOLUTION INTRAVENOUS CONTINUOUS PRN
Status: DISCONTINUED | OUTPATIENT
Start: 2020-11-05 | End: 2020-11-05 | Stop reason: SURG

## 2020-11-05 RX ORDER — BACITRACIN 50000 [USP'U]/1
INJECTION, POWDER, LYOPHILIZED, FOR SOLUTION INTRAMUSCULAR AS NEEDED
Status: DISCONTINUED | OUTPATIENT
Start: 2020-11-05 | End: 2020-11-05 | Stop reason: HOSPADM

## 2020-11-05 RX ORDER — FAMOTIDINE 10 MG/ML
INJECTION, SOLUTION INTRAVENOUS AS NEEDED
Status: DISCONTINUED | OUTPATIENT
Start: 2020-11-05 | End: 2020-11-05 | Stop reason: SURG

## 2020-11-05 RX ORDER — NALOXONE HYDROCHLORIDE 0.4 MG/ML
80 INJECTION, SOLUTION INTRAMUSCULAR; INTRAVENOUS; SUBCUTANEOUS AS NEEDED
Status: ACTIVE | OUTPATIENT
Start: 2020-11-05 | End: 2020-11-06

## 2020-11-05 RX ORDER — HYDROCODONE BITARTRATE AND ACETAMINOPHEN 5; 325 MG/1; MG/1
2 TABLET ORAL AS NEEDED
Status: CANCELLED | OUTPATIENT
Start: 2020-11-05

## 2020-11-05 RX ORDER — MIDAZOLAM HYDROCHLORIDE 1 MG/ML
1 INJECTION INTRAMUSCULAR; INTRAVENOUS EVERY 5 MIN PRN
Status: CANCELLED | OUTPATIENT
Start: 2020-11-05 | End: 2020-11-06

## 2020-11-05 RX ORDER — ROCURONIUM BROMIDE 10 MG/ML
INJECTION, SOLUTION INTRAVENOUS AS NEEDED
Status: DISCONTINUED | OUTPATIENT
Start: 2020-11-05 | End: 2020-11-05 | Stop reason: SURG

## 2020-11-05 RX ORDER — METOCLOPRAMIDE HYDROCHLORIDE 5 MG/ML
INJECTION INTRAMUSCULAR; INTRAVENOUS
Status: COMPLETED
Start: 2020-11-05 | End: 2020-11-05

## 2020-11-05 RX ORDER — DEXAMETHASONE SODIUM PHOSPHATE 4 MG/ML
VIAL (ML) INJECTION AS NEEDED
Status: DISCONTINUED | OUTPATIENT
Start: 2020-11-05 | End: 2020-11-05 | Stop reason: SURG

## 2020-11-05 RX ORDER — DIPHENHYDRAMINE HYDROCHLORIDE 50 MG/ML
12.5 INJECTION INTRAMUSCULAR; INTRAVENOUS AS NEEDED
Status: ACTIVE | OUTPATIENT
Start: 2020-11-05 | End: 2020-11-06

## 2020-11-05 RX ORDER — HYDROCODONE BITARTRATE AND ACETAMINOPHEN 5; 325 MG/1; MG/1
1 TABLET ORAL AS NEEDED
Status: CANCELLED | OUTPATIENT
Start: 2020-11-05

## 2020-11-05 RX ORDER — HYDROMORPHONE HYDROCHLORIDE 1 MG/ML
0.4 INJECTION, SOLUTION INTRAMUSCULAR; INTRAVENOUS; SUBCUTANEOUS EVERY 5 MIN PRN
Status: ACTIVE | OUTPATIENT
Start: 2020-11-05 | End: 2020-11-06

## 2020-11-05 RX ORDER — ONDANSETRON 2 MG/ML
INJECTION INTRAMUSCULAR; INTRAVENOUS AS NEEDED
Status: DISCONTINUED | OUTPATIENT
Start: 2020-11-05 | End: 2020-11-05 | Stop reason: SURG

## 2020-11-05 RX ORDER — LIDOCAINE HYDROCHLORIDE 10 MG/ML
INJECTION, SOLUTION EPIDURAL; INFILTRATION; INTRACAUDAL; PERINEURAL AS NEEDED
Status: DISCONTINUED | OUTPATIENT
Start: 2020-11-05 | End: 2020-11-05 | Stop reason: SURG

## 2020-11-05 RX ADMIN — DEXAMETHASONE SODIUM PHOSPHATE 4 MG: 4 MG/ML VIAL (ML) INJECTION at 17:35:00

## 2020-11-05 RX ADMIN — ROCURONIUM BROMIDE 50 MG: 10 INJECTION, SOLUTION INTRAVENOUS at 17:35:00

## 2020-11-05 RX ADMIN — PHENYLEPHRINE HCL 50 MCG: 10 MG/ML VIAL (ML) INJECTION at 19:09:00

## 2020-11-05 RX ADMIN — SODIUM CHLORIDE: 9 INJECTION, SOLUTION INTRAVENOUS at 19:10:00

## 2020-11-05 RX ADMIN — ONDANSETRON 4 MG: 2 INJECTION INTRAMUSCULAR; INTRAVENOUS at 17:35:00

## 2020-11-05 RX ADMIN — LIDOCAINE HYDROCHLORIDE 30 MG: 10 INJECTION, SOLUTION EPIDURAL; INFILTRATION; INTRACAUDAL; PERINEURAL at 17:35:00

## 2020-11-05 RX ADMIN — METOCLOPRAMIDE HYDROCHLORIDE 10 MG: 5 INJECTION INTRAMUSCULAR; INTRAVENOUS at 17:59:00

## 2020-11-05 RX ADMIN — FAMOTIDINE 20 MG: 10 INJECTION, SOLUTION INTRAVENOUS at 17:45:00

## 2020-11-05 RX ADMIN — SODIUM CHLORIDE: 9 INJECTION, SOLUTION INTRAVENOUS at 17:27:00

## 2020-11-05 RX ADMIN — VANCOMYCIN HYDROCHLORIDE 1.25 G: at 17:38:00

## 2020-11-05 RX ADMIN — ROCURONIUM BROMIDE 20 MG: 10 INJECTION, SOLUTION INTRAVENOUS at 19:04:00

## 2020-11-05 RX ADMIN — PHENYLEPHRINE HCL 50 MCG: 10 MG/ML VIAL (ML) INJECTION at 19:53:00

## 2020-11-05 NOTE — PROGRESS NOTES
[facility] Hospitalist Progress Note     PCP: Paola Soriano MD    CC: Follow up       Assessment/Plan:   Chayo Fuentes is a 63year old female with PMH sig for GERD, asthma, RA, chiari malformation s/p decompression and duraplsty 3/20 c/b suboccipital Pantoprazole Sodium  20 mg Oral QAM AC   • docusate sodium  100 mg Oral BID     • Potassium Chloride in NaCl     • niCARdipine       HYDROcodone-acetaminophen **OR** HYDROcodone-acetaminophen, ondansetron HCl, niCARdipine, Labetalol HCl, hydrALAzine HCl, m

## 2020-11-05 NOTE — CM/SW NOTE
11/05/20 1300   CM/SW Screening   Referral Source    Information Source Chart review;[facility] staff   Patient lives with Alone   Discharge Needs   Anticipated D/C needs To be determined     Patient was screened, no dc needs are identified at this

## 2020-11-05 NOTE — PROGRESS NOTES
[facility] [facility]  Neurocritical Care Progress Note     Jovanny Edwards Patient Status:  Inpatient    1960 MRN [id number]   [facility] 6NE-A Attending Ruthy Delaney MD   Hosp Day # 1 PCP MD Cortney Arita 500 mg, 500 mg, Oral, Q6H PRN    Or    •  acetaminophen (TYLENOL EXTRA STRENGTH) tab 1,000 mg, 1,000 mg, Oral, Q6H PRN      Physical Examination:   General- No acute distress  CV- RRR  Resp- CTA Bilat  Neuro-  · Mental status- awake and alert, regards and Prophylaxis:  SCD’s    Goals of the Day: neurochecks  A total of 40 minutes of critical care time (exclusive of billable procedures) was administered to manage and/or prevent neurologic instability.  This involved direct patient intervention, complex decisi

## 2020-11-05 NOTE — PLAN OF CARE
Assumed care of pt from PACU post  shunt placement. Pt is AOdonald, YODIT. Incision to right occipital region open to air, abd incision with dry gauze drsg C/D/I.  Pt able to ambulate to bathroom but does not like to sit up too long as she states her head hurst

## 2020-11-05 NOTE — ANESTHESIA PREPROCEDURE EVALUATION
PRE-OP EVALUATION    Patient Name: Sara Carballo    Pre-op Diagnosis: Pseudomeningocele [L58.876]    Procedure(s):  VENTRICULAR CATHETER REVISION    Surgeon(s) and Role:     Dontae Alas MD - Primary    Pre-op vitals reviewed.   Temp: 99.3 °F (3 [MAR Hold] acetaminophen (TYLENOL EXTRA STRENGTH) tab 500 mg, 500 mg, Oral, Q6H PRN    Or    •  [MAR Hold] acetaminophen (TYLENOL EXTRA STRENGTH) tab 1,000 mg, 1,000 mg, Oral, Q6H PRN        Outpatient Medications:  [unfilled]    Allergies: Mercedes Junior HCT 39.5 11/05/2020    MCV 92.3 11/05/2020    MCH 28.5 11/05/2020    MCHC 30.9 (L) 11/05/2020    RDW 13.2 11/05/2020    .0 11/05/2020     Lab Results   Component Value Date     11/05/2020     10/23/2020    K 3.8 11/05/2020    K 3.96 10/2

## 2020-11-05 NOTE — PAYOR COMM NOTE
--------------  CONTINUED STAY REVIEW    Payor: Laurel Cook  #:  [id number]  Authorization Number: [id number]    Admit date: 11/4/20  Admit time: [address]    Admitting Physician: Eligio Fu MD  Attending Physician:  Eligio Fu supine on the operative table and placed under general endotracheal anesthesia.  Sequential compression boots were applied and a gel pad roll was placed under the right torso to turn the head to the left to expose the right occipital area without excessivel anterior rectus sheath. The shunt passer was tunneled from the abdominal to the occipital incision, the peritoneal catheter was deployed, and the shunt passer withdrawn.  The Medtronic strata valve set at 2.0 was secured to the proximal end of the peritonea suture in the anterior rectus sheath, and 2-0 and 3-0 Vicryl suture in the subcutaneous tissue. The skin was closed with 3-0 Vicryl Rapide and Dermabond. A coverlet dressing applied. The cranial incision was closed with 2-0 interrupted Vicryl in the galea Neurosurgeon on-call, Dr Natty Meyer, updated. Imaging reviewed by MD.  Orders entered as requested. SBP goal 120-140, prns medications. RN updated.   MEAGHAN Molina  Critical Care NP   11/4/2020 10:56 PM    Neurosurgery Progress Note   Blood pressure 122 Nina De La Torre RN      lactated ringers infusion     Date Action Dose Route User    11/4/2020 1040 New Bag (none) Intravenous Rainer Dickinson RN      vancomycin IVPB premix 1.25g in 0.9% NaCl 250 mL     Date Action Dose Route User    11/4/2020 2125 Ne

## 2020-11-05 NOTE — PLAN OF CARE
Problem: Impaired Activities of Daily Living  Goal: Achieve highest/safest level of independence in self care  Description: Interventions:  - Assess ability and encourage patient to participate in ADLs to maximize function  - Promote sitting position [facility]

## 2020-11-05 NOTE — PHYSICAL THERAPY NOTE
PHYSICAL THERAPY EVALUATION - INPATIENT     Room Number: 3125/4655-C  Evaluation Date: 11/5/2020  Type of Evaluation: Initial  Physician Order: PT Eval and Treat    Presenting Problem: S/p  shunt insertion 11/4  Reason for Therapy: Mobility Dysfunc implant   • SINUS SURGERY           HOME SITUATION  Type of Home: House   Home Layout: Two level;Ramped entrance  Stairs to Enter : 0     Stairs to Bedroom: 13  Railing: Yes    Lives With: Alone  Drives: Yes  Patient Owned Equipment: None       Prior Level Need to walk in hospital room?: A Little   -   Climbing 3-5 steps with a railing?: A Little       AM-PAC Score:  Raw Score: 21   Approx Degree of Impairment: 28.97%   Standardized Score (AM-PAC Scale): 50.25   CMS Modifier (G-Code): CHICHO    FUNCTIONAL ABILI presentation is stable and overall the evaluation complexity is considered low. These impairments and comorbidities manifest themselves as functional limitations in independent bed mobility, transfers, and gait.   The patient is below baseline and would be

## 2020-11-05 NOTE — PROGRESS NOTES
Notified by RN of patient having intermittent episodes of tachycardia with HTN to 160s, no obvious trigger and exam unchanged. Recent post-op imaging results reviewed. Neurosurgeon on-call, Dr Zain Alcocer, updated.   Imaging reviewed by MD.  Orders entered as r

## 2020-11-05 NOTE — ANESTHESIA PROCEDURE NOTES
Airway  Date/Time: 11/5/2020 5:39 PM  Urgency: elective    Airway not difficult    General Information and Staff    Patient location during procedure: OR  Anesthesiologist: Anirudh Shirley MD  Performed: anesthesiologist     Indications and Patient Conditi

## 2020-11-05 NOTE — PLAN OF CARE
Ambulating halls without difficulty. Neurologically intact, occasional word finding difficulty. Having some mild-moderate pain in right head near incisional site and abdominal site, declined pain medication at this time.  Kept NPO, taken to OR for  shunt

## 2020-11-05 NOTE — OCCUPATIONAL THERAPY NOTE
OCCUPATIONAL THERAPY QUICK EVALUATION - INPATIENT    Room Number: 2341/5758-K  Evaluation Date: 11/5/2020     Type of Evaluation: Quick Eval  Presenting Problem:  shunt placement 11/4    Physician Order: IP Consult to Occupational Therapy  Reason for The Performed by Tino Sheets MD at [facility] [facility] MAIN OR   • PART EDENT IMPL/FIXED DENT      dental implant   • SINUS SURGERY           OCCUPATIONAL PROFILE    HOME SITUATION  Type of Home: House  Home Layout: Two level;Ramped entrance  Lives With: Alone    Toilet independent  Sit to Stand: Modified independent    Skilled Therapy Provided:Modified independent supine to sit. Ambulated to bathroom modified independent.  Toilet transfer, hygiene care, clothing management, and sink side hand hygiene modified independent

## 2020-11-05 NOTE — PROGRESS NOTES
[facility]  Neurosurgery Progress Note    Bethtaisha Guardadokassy Patient Status:  Inpatient    1960 MRN [id number]   [facility] 6NE-A Attending Emmanuel Chaudhary MD   Hosp Day # 1 PCP Sarbjit Royal MD     Chief Complaint:  Pseudo left lateral ventricle . This this extends over the ambient cistern. Sequelae of suboccipital craniectomy is noted. Pseudomeningocele in this region is stable. Ventricles are appropriate. No hydrocephalus.   Minimal prominence of the sulci is sta

## 2020-11-05 NOTE — PROGRESS NOTES
Please see the anesthetic record and post anesthesia note. Post anesthesia visit:    Patient seen in ICU. History of vocal cord paralysis preop. Patient asserts symptoms not worse post procedure, no new issues.   Informed patient used difficult airway eq

## 2020-11-05 NOTE — PROGRESS NOTES
2000- received pt alert and orientated. Vss. Neuro's intact. Pt does state that it is  difficult to find words sometimes but also states that is not new for her. Post. Head incision cdi as well abd drsg. No real c/o, no apparent distress noted.  8552- pt's

## 2020-11-06 ENCOUNTER — APPOINTMENT (OUTPATIENT)
Dept: CT IMAGING | Facility: HOSPITAL | Age: 60
DRG: 031 | End: 2020-11-06
Attending: NEUROLOGICAL SURGERY
Payer: COMMERCIAL

## 2020-11-06 ENCOUNTER — TELEPHONE (OUTPATIENT)
Dept: SURGERY | Facility: CLINIC | Age: 60
End: 2020-11-06

## 2020-11-06 PROCEDURE — 70450 CT HEAD/BRAIN W/O DYE: CPT | Performed by: NEUROLOGICAL SURGERY

## 2020-11-06 PROCEDURE — 99291 CRITICAL CARE FIRST HOUR: CPT | Performed by: INTERNAL MEDICINE

## 2020-11-06 RX ORDER — POTASSIUM CHLORIDE 20 MEQ/1
40 TABLET, EXTENDED RELEASE ORAL ONCE
Status: COMPLETED | OUTPATIENT
Start: 2020-11-06 | End: 2020-11-06

## 2020-11-06 NOTE — OPERATIVE REPORT
[facility]    OPERATIVE REPORT    Patient:  Jared Sánchez;  YOB: 1960     CSN:  [id number]; Medical Record Number:  [id number]    Admission Date:  11/4/2020 Operation Date:  11/5/2020    . ..........................     Operating Phys underneath the mattress along the right torso to assist in turning her head to the left, exposing the right parietal occipital region and her incision from the previous day.   She was secured on a horseshoe headrest.  Her previous posterior parietal occipit this true lateral position and draped a sterile and moved away from the operative site. The operative site was then prepped and draped in a sterile fashion.     The incision was reopened with debridement of suture material.  The catheter and valve were exp [facility]

## 2020-11-06 NOTE — PROGRESS NOTES
[facility]  Neurosurgery Progress Note    Colleen Galvan Patient Status:  Inpatient    1960 MRN [id number]   [facility] 6NE-A Attending Tino Sheets MD   Hosp Day # 2 PCP Ketty Marino MD     Chief Complaint:  Pseudo thickening noted of the visualized paranasal sinuses.              =====  CONCLUSION:  Interval repositioning of a right parietal approach ventriculostomy catheter which now terminates in the region of the third ventricle.   A small amount of pneumocephalus

## 2020-11-06 NOTE — SLP NOTE
SPEECH/LANGUAGE/COGNITIVE RE-EVALUATION - INPATIENT    Admission Date: 11/4/2020  Evaluation Date: 11/06/20    Reason for Referral: (revised shunt)    ASSESSMENT & PLAN   ASSESSMENT & IMPRESSION  Patient is a 63year old female who sustained a Chiari I mal were obtained:     Orientation Able to state 3 correct responses out of 3 opportunities. Immediate Memory Able to recall 5/5 unrelated words immediately. Delayed Memory Able to recall 3/5 unrelated words with a short delay with cognitive distraction. by Donnie Barboza MD at [facility] MAIN OR   • PART EDENT IMPL/FIXED DENT      dental implant   • SINUS SURGERY             Discharge Recommendations/Plan: Home    Patient Experiencing Pain: No                           GOALS  Goal #1 Patient will participate i

## 2020-11-06 NOTE — PAYOR COMM NOTE
--------------  CONTINUED STAY REVIEW    Payor: Laurel Cook  #:  [id number]  Authorization Number: [id number]    Admit date: 11/4/20  Admit time: [address]    Admitting Physician: Mulu Lee MD  Attending Physician:  Mulu Lee the right parietal occipital region and her incision from the previous day.   She was secured on a horseshoe headrest.  Her previous posterior parietal occipital incision was prepped and draped in a sterile fashion after removing the Dermabond over her sutu operative site was then prepped and draped in a sterile fashion.     The incision was reopened with debridement of suture material.  The catheter and valve were exposed and this time I disconnected the catheter from the proximal end of the valve and utiliz (79.7 kg), SpO2 96 %    Component Value Date     WBC 19.6 11/05/2020     HGB 12.2 11/05/2020     HCT 39.5 11/05/2020     .0 11/05/2020     CREATSERUM 0.77 11/05/2020     BUN 9 11/05/2020      11/05/2020     K 3.8 11/05/2020      11/05/20 Dose Route User    11/5/2020 2148 Given 10 mg Intravenous Vivi Mercado RN    11/5/2020 2107 Given 10 mg Intravenous Alla Serrato, RN      lidocaine PF (XYLOCAINE) 1% injection     Date Action Dose Route User    11/5/2020 1735 Given 30 mg

## 2020-11-06 NOTE — ANESTHESIA POSTPROCEDURE EVALUATION
[address] [facility] Patient Status:  Inpatient   Age/Gender 63year old female MRN [id number]   [facility] 6NE-A Attending Adriana Vance MD   Hosp Day # 1 PCP Bryson Still MD       Anesthesia Post-op Note    Procedu

## 2020-11-06 NOTE — PROGRESS NOTES
[facility]  Neurocritical Care Progress Note     Jerad Phlegm Patient Status:  Inpatient    1960 MRN [id number]   [facility] 6NE-A Attending Joe Damon MD   Hosp Day # 2 PCP MD Violetta Devries Kenyon Q6H PRN    Or    •  acetaminophen (TYLENOL EXTRA STRENGTH) tab 1,000 mg, 1,000 mg, Oral, Q6H PRN      Physical Examination:   General- No acute distress  CV- RRR  Resp- CTA Bilat  Neuro-  · Mental status- awake and alert, regards and follows commands, orie 11/5, postop per NS, cont neurochecks/pt/ot     chiari malformation - s/p crani for chiari decompression and duraplasty 3/16     Cardiac:  sbp goal 100-150     Pulmonary:  Stable on RA  Renal:  IVFs, monitor BUN/Cr  GI:  NPO for OR  Heme/ID:  Afebrile, ever

## 2020-11-06 NOTE — PLAN OF CARE
Received from OR approx 2050 s/p revision of  shunt that was placed 11/4th because of \"kinked\"/coiled shunt. On arrival, alert but confused with patient not recognizing her room. Also nauseated, \"feeling sick. Reglan given.  During recovery time-BP

## 2020-11-06 NOTE — PROGRESS NOTES
[facility] Hospitalist Progress Note     PCP: Fercho Garcia MD    CC: Follow up       Assessment/Plan:   Sara Carballo is a 63year old female with PMH sig for GERD, asthma, RA, chiari malformation s/p decompression and duraplsty 3/20 c/b suboccipital nontender, nondistended, no organomegaly, bowel sounds present  MSK:   no clubbing, no cyanosis.   No Lower extremity edema  Skin: no rashes or lesions, well perfused  Psych: mood stable, cooperative  Neuro: No focal deficits    Medications     • Pantoprazo into the region of the ambient cistern.    Dictated by (CST): Winifred Vasquez MD on 11/04/2020 at 9:02 PM     Finalized by (CST): Winifred Vasquez MD on 11/04/2020 at 9:10 PM       Xr Skull Trauma  (cpt=70250)    Result Date: 11/5/2020  CONCLUSION:  Ri

## 2020-11-06 NOTE — TELEPHONE ENCOUNTER
Pt directed to make appt for next week w/Dr Fernanda Herring, please advise where to schedule as day is full; Rita Fraser awaiting call back w/schedule appt info to include on pt's discharge instructions

## 2020-11-07 ENCOUNTER — TELEPHONE (OUTPATIENT)
Dept: SURGERY | Facility: HOSPITAL | Age: 60
End: 2020-11-07

## 2020-11-07 VITALS
TEMPERATURE: 98 F | DIASTOLIC BLOOD PRESSURE: 91 MMHG | WEIGHT: 168.44 LBS | SYSTOLIC BLOOD PRESSURE: 135 MMHG | OXYGEN SATURATION: 98 % | RESPIRATION RATE: 25 BRPM | HEIGHT: 58 IN | BODY MASS INDEX: 35.36 KG/M2 | HEART RATE: 108 BPM

## 2020-11-07 PROCEDURE — 99291 CRITICAL CARE FIRST HOUR: CPT | Performed by: INTERNAL MEDICINE

## 2020-11-07 RX ORDER — HYDROCODONE BITARTRATE AND ACETAMINOPHEN 5; 325 MG/1; MG/1
1-2 TABLET ORAL EVERY 4 HOURS PRN
Qty: 45 TABLET | Refills: 0 | Status: SHIPPED | OUTPATIENT
Start: 2020-11-07 | End: 2020-11-12 | Stop reason: ALTCHOICE

## 2020-11-07 NOTE — PLAN OF CARE
Assumed care approx 1930, s/p  shunt w/revision. Pt is A&Ox4, pleasant and cooperative. Neurologically intact. Reports mild incisional pain to head and abdominal surgical incisions, declines pain medication. Vitals stable, NSR on monitor.   Has transf

## 2020-11-07 NOTE — DISCHARGE SUMMARY
[facility]  Discharge Summary    Jonh Mancia Patient Status:  Inpatient    1960 MRN [id number]   [facility] 6NE-A Attending Herrera Pastrana MD   [facility] Day # 3 PCP Minerva Hameed MD     Date of Admission: 2020    D cord paralysis. He recommended injection vs observation. She want to observe for now.     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 She has been at home. Gained weight. Right shoulder weak and tight. Leg spasms resolved.  Shocks to hand for several weeks back in the spring she is had some buckling of the legs in the morning especially with her knees.  She has had 3 rounds of steroids 40 mg of prednisone seems to help but does not last.  She had a 30 pound weight gain over the last several

## 2020-11-07 NOTE — PROGRESS NOTES
[facility]  Neurosurgery Progress Note    Hai Escoto Patient Status:  Inpatient    1960 MRN [id number]   [facility] 6NE-A Attending Elsa Moseley MD   Hosp Day # 3 PCP Calli Gavin MD     Chief Complaint:  Pseudo

## 2020-11-07 NOTE — PROGRESS NOTES
[facility] Hospitalist Progress Note     PCP: Minerva Hameed MD    CC: Follow up       Assessment/Plan:   Jonh Mancia is a 63year old female with PMH sig for GERD, asthma, RA, chiari malformation s/p decompression and duraplsty 3/20 c/b suboccipital present  MSK:   no clubbing, no cyanosis.   No Lower extremity edema  Skin: no rashes or lesions, well perfused  Psych: mood stable, cooperative  Neuro: No focal deficits    Medications             Data Review:       Labs:     Recent Labs   Lab 11/05/20  04 acute findings. .   Dictated by (CST): Colby Aase, MD on 11/05/2020 at 7:38 PM     Finalized by (CST): Colby Aase, MD on 11/05/2020 at 7:41 PM       Xr Fluoroscopy C-arm Time <1 Hour  (cpt=76000)    Result Date: 11/5/2020  CONCLUSION: Oneida Melton

## 2020-11-07 NOTE — PROGRESS NOTES
[facility]  Neurocritical Care Progress Note     Renea Negra Patient Status:  Inpatient    1960 MRN [id number]   [facility] 6NE-A Attending Donnie Barboza MD   Hosp Day # 3 PCP MD Wilfredo Salgado mg, Oral, Q6H PRN    Or    •  acetaminophen (TYLENOL EXTRA STRENGTH) tab 1,000 mg, 1,000 mg, Oral, Q6H PRN      Physical Examination:   General- No acute distress  CV- RRR  Resp- CTA Bilat  Neuro-  · Mental status- awake and alert, regards and follows comm

## 2020-11-09 RX ORDER — ACETAZOLAMIDE 500 MG/1
CAPSULE, EXTENDED RELEASE ORAL
Qty: 60 CAPSULE | Refills: 0 | OUTPATIENT
Start: 2020-11-09

## 2020-11-09 NOTE — PAYOR COMM NOTE
--------------  DISCHARGE REVIEW    Payor: Laurel Cook  #:  [id number]  Authorization Number: [id number]    Admit date: 11/4/20  Admit time:  0200  Discharge Date: 11/7/2020 10:15 AM     Admitting Physician: MD Aquiles Lewis REVISION ONLY    History of Present Illness: Zo Forts a 63year [address] 45 female here in follow up after 3/16/2020 Chiari decompression Dr. Jackie Santos. She is getting increasing HAs. Increased neck pain and tightness with shooting pain.  Since last vis an orthopedist who ordered an MRI of her cervical spine find a Chiari malformation.     She does have occipital pressure and pain.  She complains of dizziness, vertigo, lightheadedness, unsteadiness with walking.  She is fallen on 3 occasions.  Her occipit mouth daily. Before meal  Qty: 30 capsule Refills: 0      STOP taking these medications    acetaminophen 325 MG Oral Tab               Patient Instructions: Activity: OOB, ambulate several times a day.   Avoid prolonged immobility  Wound Care: Ok to shower

## 2020-11-09 NOTE — TELEPHONE ENCOUNTER
Patient was called. The refill for Diamox was automatic from the pharmacy. She does not need it or want it. Future request from her pharmacy will be denied. Patient is postop from Wednesday and Thursday.   She is feeling pretty washed out from the ane

## 2020-11-09 NOTE — TELEPHONE ENCOUNTER
Medication: acetazolamide ER 500mg BID #60/0refill    Date of last refill: 9-  [place]    Last office visit: 10-  Due back to clinic per last office note:  2 weeks post op  Date next office visit schedu

## 2020-11-12 ENCOUNTER — OFFICE VISIT (OUTPATIENT)
Dept: SURGERY | Facility: CLINIC | Age: 60
End: 2020-11-12
Payer: COMMERCIAL

## 2020-11-12 VITALS
SYSTOLIC BLOOD PRESSURE: 112 MMHG | RESPIRATION RATE: 16 BRPM | OXYGEN SATURATION: 98 % | DIASTOLIC BLOOD PRESSURE: 68 MMHG | HEART RATE: 96 BPM

## 2020-11-12 DIAGNOSIS — G96.198 PSEUDOMENINGOCELE: ICD-10-CM

## 2020-11-12 DIAGNOSIS — Z98.2 S/P VP SHUNT: ICD-10-CM

## 2020-11-12 DIAGNOSIS — G93.5 CHIARI I MALFORMATION (HCC): Primary | ICD-10-CM

## 2020-11-12 PROCEDURE — 3074F SYST BP LT 130 MM HG: CPT | Performed by: NEUROLOGICAL SURGERY

## 2020-11-12 PROCEDURE — 3078F DIAST BP <80 MM HG: CPT | Performed by: NEUROLOGICAL SURGERY

## 2020-11-12 PROCEDURE — 99024 POSTOP FOLLOW-UP VISIT: CPT | Performed by: NEUROLOGICAL SURGERY

## 2020-11-12 NOTE — PROGRESS NOTES
Neurosurgery  Follow up      [address] is a 63year old RH female here in follow up after 3/16/2020 Chiari decompression Dr. Collette Yo.      S/P MEDTRONIC STRATA PROGRAMMABLE VALVE , SETTING 2.0, revision 11/5/2020 of ventric next days. Getting worse every month. Legs feel weak. Walking into walls and furniture. Eyes now light sensitivity. Last hx:  here for neurosurgical consultation for recently diagnosed Chiari malformation.   States in April and May of this year she st plate injury 6/4/0557   • Rheumatoid arthritis ([facility] [address]    • Subluxation of metatarsophalangeal joint of lesser toe 9/9/2014   • Visual impairment     glasses for reading   • Vitamin D deficiency        PAST SURGICAL HISTORY:  Past Surgical History:   Procedu RASH, OTHER (SEE COMMENTS)    Comment:Difficulty breathing  Marcaine Hcl [Bupiv*    RASH    Comment:Redness and swelling at the injection site  Yeast                   ITCHING    MEDICATIONS:    •  HYDROcodone-acetaminophen 5-325 MG Oral Tab, Take 1-2 tabl craniectomy is noted. Associated partial reduction of Chiari 1 malformation is noted. Pseudomeningocele at the postoperative site is noted. There is effacement of the 4th ventricle that is new when compared to prior examination.   Minimal prominence of t [phone]

## 2020-11-12 NOTE — PATIENT INSTRUCTIONS
Refill policies:    • Allow 2-3 business days for refills; controlled substances may take longer.   • Contact your pharmacy at least 5 days prior to running out of medication and have them send an electronic request or submit request through the “request re Depending on your insurance carrier, approval may take 3-10 days. It is highly recommended patients contact their insurance carrier directly to determine coverage.   If test is done without insurance authorization, patient may be responsible for the entire 11/4/2020-11/14/2020 at last apt.  Intermittent leave completed for 4-5x per month, 1-2 days per event   -call with changes

## 2020-11-23 ENCOUNTER — HOSPITAL ENCOUNTER (OUTPATIENT)
Dept: CT IMAGING | Facility: HOSPITAL | Age: 60
Discharge: HOME | End: 2020-11-23
Attending: PHYSICIAN ASSISTANT
Payer: COMMERCIAL

## 2020-11-23 ENCOUNTER — TELEPHONE (OUTPATIENT)
Dept: SURGERY | Facility: CLINIC | Age: 60
End: 2020-11-23

## 2020-11-23 ENCOUNTER — OFFICE VISIT (OUTPATIENT)
Dept: SURGERY | Facility: CLINIC | Age: 60
End: 2020-11-23

## 2020-11-23 VITALS
BODY MASS INDEX: 35.36 KG/M2 | HEIGHT: 58 IN | DIASTOLIC BLOOD PRESSURE: 90 MMHG | HEART RATE: 65 BPM | SYSTOLIC BLOOD PRESSURE: 130 MMHG | WEIGHT: 168.44 LBS

## 2020-11-23 DIAGNOSIS — R51.9 ACUTE NONINTRACTABLE HEADACHE, UNSPECIFIED HEADACHE TYPE: ICD-10-CM

## 2020-11-23 DIAGNOSIS — G96.198 PSEUDOMENINGOCELE: ICD-10-CM

## 2020-11-23 DIAGNOSIS — R26.9 NEUROLOGIC GAIT DYSFUNCTION: ICD-10-CM

## 2020-11-23 DIAGNOSIS — Z98.2 S/P VP SHUNT: ICD-10-CM

## 2020-11-23 DIAGNOSIS — R42 DIZZINESS: ICD-10-CM

## 2020-11-23 DIAGNOSIS — G93.5 CHIARI I MALFORMATION (HCC): ICD-10-CM

## 2020-11-23 DIAGNOSIS — G93.5 CHIARI I MALFORMATION (HCC): Primary | ICD-10-CM

## 2020-11-23 PROCEDURE — 3075F SYST BP GE 130 - 139MM HG: CPT | Performed by: PHYSICIAN ASSISTANT

## 2020-11-23 PROCEDURE — 70450 CT HEAD/BRAIN W/O DYE: CPT | Performed by: PHYSICIAN ASSISTANT

## 2020-11-23 PROCEDURE — 99024 POSTOP FOLLOW-UP VISIT: CPT | Performed by: PHYSICIAN ASSISTANT

## 2020-11-23 PROCEDURE — 3008F BODY MASS INDEX DOCD: CPT | Performed by: PHYSICIAN ASSISTANT

## 2020-11-23 PROCEDURE — 3080F DIAST BP >= 90 MM HG: CPT | Performed by: PHYSICIAN ASSISTANT

## 2020-11-23 NOTE — TELEPHONE ENCOUNTER
Patient would like to speak with Ghazal Bailey regarding pressure in her head, Please call [phone].

## 2020-11-23 NOTE — PROGRESS NOTES
Patient here for postop follow-up 11/04 shunt insertion. Patient reports pressure in head and left ear pressure. Also with dizziness, aggravated when laying down.

## 2020-11-23 NOTE — PROGRESS NOTES
Neurosurgery  Follow up      [address] is a 63year old RH female here in follow up after 3/16/2020 Chiari decompression Dr. Debra Yu.      S/P MEDTRONIC STRATA PROGRAMMABLE VALVE , SETTING 2.0, revision 11/5/2020 of ventric want to observe for now. 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 She has been at home. Gained weight. Right shoulder weak and tight. Leg spasms resolved. Shocks to hands resolved. Vision improved. Hearing better.  Chest fluttering the legs in the morning especially with her knees. She has had 3 rounds of steroids 40 mg of prednisone seems to help but does not last.  She had a 30 pound weight gain over the last several months. She denies any night sweats night pain.     PAST MEDICAL mother. SOCIAL HISTORY:   reports that she has never smoked. She has never used smokeless tobacco. She reports previous alcohol use. She reports that she does not use drugs.     ALLERGIES:    Arava [Leflunomide]     DIZZINESS, Tightness in [address] Thumb adduction Intrinsics   Right         5        5         5          5 5 5 4 5 5-   Left         5        5         5          5 5 5 4 5 4+     Lower extremity strength:     Iliopsoas  Hamstrings   Quads    D-flexion P-flexion Eversion   Right       5 spinal stenosis  7. 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 Pseudomeningocele  9. ST memory deficits  10.   shunt 11/4/2020 Dr Terrel Nyhan    PLAN:  -Follow up tomorrow morning  -Stat CT of the head tonight to rule out a subdural hematoma, to ev

## 2020-11-23 NOTE — TELEPHONE ENCOUNTER
Pt requesting sooner apt then 12/10, pt feels pressure on her brain, and doesn't think she should wait that long to be seen. Pls advise.

## 2020-11-23 NOTE — TELEPHONE ENCOUNTER
Spoke with patient who stated that:    -she would like to see provider to be evaluated prior to 12/10/20 for shunt check as she is experiencing dizziness, her ears are \"stuffy and loud\".    -she is having issues with ambulation and feels uneasy on her fee

## 2020-11-23 NOTE — PATIENT INSTRUCTIONS
Refill policies:    • Allow 2-3 business days for refills; controlled substances may take longer.   • Contact your pharmacy at least 5 days prior to running out of medication and have them send an electronic request or submit request through the “request re Depending on your insurance carrier, approval may take 3-10 days. It is highly recommended patients contact their insurance carrier directly to determine coverage.   If test is done without insurance authorization, patient may be responsible for the entire ventricles  -If her ventricles are stable and no subdural hematoma as discussed at her last appointment will consider turning her down to 1.5.

## 2020-11-23 NOTE — TELEPHONE ENCOUNTER
Per Kelin Holguin RN schedule pt for 2:30 today for [facility] for post op. Pt scheduled.

## 2020-11-24 ENCOUNTER — OFFICE VISIT (OUTPATIENT)
Dept: SURGERY | Facility: CLINIC | Age: 60
End: 2020-11-24

## 2020-11-24 DIAGNOSIS — Z98.2 S/P VP SHUNT: ICD-10-CM

## 2020-11-24 DIAGNOSIS — G93.5 CHIARI I MALFORMATION (HCC): Primary | ICD-10-CM

## 2020-11-24 DIAGNOSIS — G96.198 PSEUDOMENINGOCELE: ICD-10-CM

## 2020-11-24 PROCEDURE — 99024 POSTOP FOLLOW-UP VISIT: CPT | Performed by: PHYSICIAN ASSISTANT

## 2020-11-24 NOTE — PATIENT INSTRUCTIONS
Refill policies:    • Allow 2-3 business days for refills; controlled substances may take longer.   • Contact your pharmacy at least 5 days prior to running out of medication and have them send an electronic request or submit request through the “request re Depending on your insurance carrier, approval may take 3-10 days. It is highly recommended patients contact their insurance carrier directly to determine coverage.   If test is done without insurance authorization, patient may be responsible for the entire repeating the CT  -Before discharge her left ear was feeling better.   She was feeling somewhat unsteady her MA escorted her down to her

## 2020-11-24 NOTE — PROGRESS NOTES
Neurosurgery  Follow up      [address] is a 63year old RH female here in follow up after 3/16/2020 Chiari decompression Dr. Pat Peter.      S/P MEDTRONIC STRATA PROGRAMMABLE VALVE , SETTING 2.0, revision 11/5/2020 of ventric Dr. Cordelia Quinones ENT. Right partial vocal cord paralysis. He recommended injection vs observation. She want to observe for now. 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 She has been at home. Gained weight. Right shoulder weak and tight. back pain. Her legs were weak for several weeks back in the spring she is had some buckling of the legs in the morning especially with her knees.   She has had 3 rounds of steroids 40 mg of prednisone seems to help but does not last.  She had a 30 pound we father and mother; Hypertension in her father; Musculo-skelatal Disorder in her father and mother. SOCIAL HISTORY:   reports that she has never smoked. She has never used smokeless tobacco. She reports previous alcohol use.  She reports that she does not strength:      Deltoid    Triceps     Biceps        Wrist Extension  Finger extension Thumb Abduction  Thumb adduction Intrinsics   Right         5        5         5          5 5 5 4 5 5-   Left         5        5         5          5 5 5 4 5 4+     L L3-L4 mild central spinal canal stenosis. 2. Mild bilateral foraminal stenosis at L3-L4, L4-L5, and L5-S1. L4-5 anterior listhesis with central stenosis    ASSESSMENT:  1. Chiari decompression 3/16/2020  2. Cervical stenosis C4-5 C5-6 C6-7  3.   Cognit

## 2020-12-05 ENCOUNTER — HOSPITAL ENCOUNTER (OUTPATIENT)
Dept: CT IMAGING | Facility: HOSPITAL | Age: 60
Discharge: HOME | End: 2020-12-05
Attending: PHYSICIAN ASSISTANT
Payer: COMMERCIAL

## 2020-12-05 DIAGNOSIS — G96.198 PSEUDOMENINGOCELE: ICD-10-CM

## 2020-12-05 DIAGNOSIS — G93.5 CHIARI I MALFORMATION (HCC): ICD-10-CM

## 2020-12-05 DIAGNOSIS — Z98.2 S/P VP SHUNT: ICD-10-CM

## 2020-12-05 PROCEDURE — 70450 CT HEAD/BRAIN W/O DYE: CPT | Performed by: PHYSICIAN ASSISTANT

## 2020-12-10 ENCOUNTER — OFFICE VISIT (OUTPATIENT)
Dept: SURGERY | Facility: CLINIC | Age: 60
End: 2020-12-10
Payer: COMMERCIAL

## 2020-12-10 VITALS
SYSTOLIC BLOOD PRESSURE: 122 MMHG | DIASTOLIC BLOOD PRESSURE: 70 MMHG | TEMPERATURE: 98 F | BODY MASS INDEX: 35.26 KG/M2 | WEIGHT: 168 LBS | OXYGEN SATURATION: 98 % | HEART RATE: 68 BPM | RESPIRATION RATE: 16 BRPM | HEIGHT: 58 IN

## 2020-12-10 DIAGNOSIS — G96.198 PSEUDOMENINGOCELE: ICD-10-CM

## 2020-12-10 DIAGNOSIS — G93.5 CHIARI I MALFORMATION (HCC): Primary | ICD-10-CM

## 2020-12-10 DIAGNOSIS — Z98.2 S/P VP SHUNT: ICD-10-CM

## 2020-12-10 PROCEDURE — 3008F BODY MASS INDEX DOCD: CPT | Performed by: NEUROLOGICAL SURGERY

## 2020-12-10 PROCEDURE — 3074F SYST BP LT 130 MM HG: CPT | Performed by: NEUROLOGICAL SURGERY

## 2020-12-10 PROCEDURE — 3078F DIAST BP <80 MM HG: CPT | Performed by: NEUROLOGICAL SURGERY

## 2020-12-10 PROCEDURE — 99024 POSTOP FOLLOW-UP VISIT: CPT | Performed by: NEUROLOGICAL SURGERY

## 2020-12-10 NOTE — PROGRESS NOTES
Neurosurgery  Follow up      [address] is a 63year old RH female here in follow up after 3/16/2020 Chiari decompression Dr. Purnima Mejia.      S/P MEDTRONIC STRATA PROGRAMMABLE VALVE , SETTING 2.0, revision 11/5/2020 of ventric colors and lights with head movement. Laid down hard and saw red dot in right eye. Looking up causes increased symptoms. Memory still hard. LT memory seems fine. ST is lacking. Some word finding difficulty.  Having hard time learning new things and remember when she has an episode of coughing or sneezing she gets shortness of breath she has double vision with Valsalva maneuvers her tinnitus is worse. She has uncomfortable cervical spine but no clear pain.   She is dropping things are fine motor is decreased i Performed by Katherine Goodrich MD at [facility] MAIN OR   • VENTRICULAR PERITONEAL SHUNT INSERTION Right 11/4/2020    Performed by Katherine Goodrich MD at [facility] MAIN OR       FAMILY HISTORY:  family history includes Allergies in her brother, father, and sister; Tete Comprehension intact. Negative PD. FM intact. Pupils equally round and reactive to light. 3+ brisk bilaterally. EOMs intact. No nystagmus. face is symmetrical. Negative PD, Shrug shoulders normally bilaterally.  cranial nerves II-XII are grossly intac magnum, including medulla bulb and obex herniation,  associated with retroflexion of the odontoid process, and mild compressive effect on the inferior  midbrain, which is mildly flattened.  \"Chiari 1.5 Malformation is defined as a cerebellar tonsillar    M

## 2021-01-11 ENCOUNTER — TELEPHONE (OUTPATIENT)
Dept: SURGERY | Facility: CLINIC | Age: 61
End: 2021-01-11

## 2021-01-11 NOTE — TELEPHONE ENCOUNTER
S: Patient has not yet scheduled CT brain that was ordered at [address] [place] [address] on 12/10/2020. B: Patient had chiari decompression on 3/16/2020 then had  shunt placed on 11/5/2020.    Patient last had CT brain on 12/5/2020 which stated: \"CONCLUSION:    1.  Stable a

## 2021-01-13 NOTE — TELEPHONE ENCOUNTER
pt states that she does not want to do CT scan, pt stated Stephanie Mcfarland told her at last OV that it was up to her if she felt she needed the test it would be availale otherwise no need to have test, please verify with Stephanie Mcfarland

## 2021-01-13 NOTE — TELEPHONE ENCOUNTER
Message below noted. Will forward message on to ALLY Acosta to clarify when back in the office tomorrow.

## 2021-01-15 ENCOUNTER — OFFICE VISIT (OUTPATIENT)
Dept: SURGERY | Facility: CLINIC | Age: 61
End: 2021-01-15

## 2021-01-15 VITALS
DIASTOLIC BLOOD PRESSURE: 86 MMHG | SYSTOLIC BLOOD PRESSURE: 124 MMHG | BODY MASS INDEX: 37.16 KG/M2 | WEIGHT: 177 LBS | HEART RATE: 67 BPM | HEIGHT: 58 IN

## 2021-01-15 DIAGNOSIS — G96.198 PSEUDOMENINGOCELE: ICD-10-CM

## 2021-01-15 DIAGNOSIS — G93.5 CHIARI I MALFORMATION (HCC): Primary | ICD-10-CM

## 2021-01-15 DIAGNOSIS — Z98.2 S/P VP SHUNT: ICD-10-CM

## 2021-01-15 PROCEDURE — 99024 POSTOP FOLLOW-UP VISIT: CPT | Performed by: PHYSICIAN ASSISTANT

## 2021-01-15 PROCEDURE — 3008F BODY MASS INDEX DOCD: CPT | Performed by: PHYSICIAN ASSISTANT

## 2021-01-15 PROCEDURE — 3079F DIAST BP 80-89 MM HG: CPT | Performed by: PHYSICIAN ASSISTANT

## 2021-01-15 PROCEDURE — 3074F SYST BP LT 130 MM HG: CPT | Performed by: PHYSICIAN ASSISTANT

## 2021-01-15 NOTE — PROGRESS NOTES
Neurosurgery  Follow up      [address] is a 63year old RH female here in follow up after 3/16/2020 Chiari decompression Dr. Truman Mckusick. S/P MEDTRONIC STRATA PROGRAMMABLE VALVE revision 11/5/2020 of ventricular catheter. caffeine seems to make her dizzy. She has not fallen. She denies any fevers she denies any incontinence. Last history 11/12/2020. Less pressure. Valsalva causes pressure. Overall feeling well. Tinnitus still present. Forgetful.  Medium and LT memory i neurosurgical consultation for recently diagnosed Chiari malformation.   States in April and May of this year she started getting a lot of pressure in the back of her head increased pain with coughing and sneezing and Valsalva she saw her chiropractor and a impairment     glasses for reading   • Vitamin D deficiency        PAST SURGICAL HISTORY:  Past Surgical History:   Procedure Laterality Date   • BRAIN SURGERY  03/16/2020    Chiari decompression   • CATARACT Bilateral    • COLONOSCOPY, POSSIBLE BIOPSY, PO Comment:EGG ALLERGY.   Taraxacum Officinal*      Cipro [Ciprofloxaci*    RASH, OTHER (SEE COMMENTS)    Comment:Difficulty breathing  Marcaine Hcl [Bupiv*    RASH    Comment:Redness and swelling at the injection site  Yeast                   ITCHING    MEDIC Comparing 11/23/20 11/6/20 the ventricular size is slightly increased to pseudomeningocele slightly increased. MRI Brain 9/8/2020  Sequelae of interval suboccipital craniectomy is noted. Associated partial reduction of Chiari 1 malformation is noted. shunt adjusted should get the CT of her head. Dr Terrel Nyhan evaluated the patient and is in agreement.       Lester Conti M.S., PA-C  [address], [facility] Jean [place]ier, [address] [place] [address] Rd  [phone]

## 2021-01-15 NOTE — PROGRESS NOTES
Patient here for 1-month follow-up shunt check. Feels an abnormality in shunt area, thinks may be glue or stitching. Notes has been gaining weight without known cause.

## 2021-01-15 NOTE — PATIENT INSTRUCTIONS
Refill policies:    • Allow 2-3 business days for refills; controlled substances may take longer.   • Contact your pharmacy at least 5 days prior to running out of medication and have them send an electronic request or submit request through the “request re Depending on your insurance carrier, approval may take 3-10 days. It is highly recommended patients contact their insurance carrier directly to determine coverage.   If test is done without insurance authorization, patient may be responsible for the entire if she has a decline we will see her sooner with a CT of the head. If she is had no improvement and would like to have her shunt adjusted should get the CT of her head.

## 2021-03-18 ENCOUNTER — OFFICE VISIT (OUTPATIENT)
Dept: SURGERY | Facility: CLINIC | Age: 61
End: 2021-03-18
Payer: COMMERCIAL

## 2021-03-18 VITALS
WEIGHT: 174 LBS | BODY MASS INDEX: 36.53 KG/M2 | SYSTOLIC BLOOD PRESSURE: 124 MMHG | DIASTOLIC BLOOD PRESSURE: 86 MMHG | HEART RATE: 63 BPM | HEIGHT: 58 IN

## 2021-03-18 DIAGNOSIS — Z98.2 S/P VP SHUNT: ICD-10-CM

## 2021-03-18 DIAGNOSIS — G93.5 CHIARI I MALFORMATION (HCC): Primary | ICD-10-CM

## 2021-03-18 DIAGNOSIS — G96.198 PSEUDOMENINGOCELE: ICD-10-CM

## 2021-03-18 PROCEDURE — 3074F SYST BP LT 130 MM HG: CPT | Performed by: NEUROLOGICAL SURGERY

## 2021-03-18 PROCEDURE — 3008F BODY MASS INDEX DOCD: CPT | Performed by: NEUROLOGICAL SURGERY

## 2021-03-18 PROCEDURE — 3079F DIAST BP 80-89 MM HG: CPT | Performed by: NEUROLOGICAL SURGERY

## 2021-03-18 PROCEDURE — 99213 OFFICE O/P EST LOW 20 MIN: CPT | Performed by: NEUROLOGICAL SURGERY

## 2021-03-18 NOTE — PROGRESS NOTES
Neurosurgery  Follow up      [address] is a 63year old RH female here in follow up after 3/16/2020 Chiari decompression Dr. James Curran. She has changed job positions to reduce stress. Instability is better with gait.  Her hea having blurry vision photophobia she is getting dizzy when she turns over in bed her ears are full and buzzing and ringing she is having trouble hearing she has a headache on the top of her head in the frontal area when she sneezes she gets increasing head  dog lease and symptoms began. Unsteady gait. Worse coughing and sneezing. Getting nystagmus with eyes. Vertigo. Spells last 1-2 minutes. If she sleeps lying she is worse the next days. Getting worse every month. Legs feel weak.  Walking into walls a healing well. NEUROLOGICAL:  This patient is alert and orientated x 3. Speech fluent. Comprehension intact. Negative PD. FM intact. EOMs intact. No nystagmus. face is symmetrical.  Shrug shoulders normally bilaterally.  cranial nerves II-XII are gr and C6-7    MRI brain 12/9/19  there is a Chiari 1.5 malformation, with peglike cerebellar  tonsils, measuring 13 mm below the foramen magnum, including medulla bulb and obex herniation,  associated with retroflexion of the odontoid process, and mild compr

## 2021-03-18 NOTE — PROGRESS NOTES
Patient here for follow-up on shunt. Headache and dizziness improved. Has slowly started workout regimen.

## 2021-04-02 ENCOUNTER — IMMUNIZATION (OUTPATIENT)
Dept: LAB | Age: 61
End: 2021-04-02
Attending: HOSPITALIST
Payer: COMMERCIAL

## 2021-04-02 DIAGNOSIS — Z23 NEED FOR VACCINATION: Primary | ICD-10-CM

## 2021-04-02 PROCEDURE — 0001A SARSCOV2 VAC 30MCG/0.3ML IM: CPT

## 2021-04-23 ENCOUNTER — IMMUNIZATION (OUTPATIENT)
Dept: LAB | Age: 61
End: 2021-04-23
Attending: HOSPITALIST
Payer: COMMERCIAL

## 2021-04-23 DIAGNOSIS — Z23 NEED FOR VACCINATION: Primary | ICD-10-CM

## 2021-04-23 PROCEDURE — 0002A SARSCOV2 VAC 30MCG/0.3ML IM: CPT

## 2021-06-24 ENCOUNTER — OFFICE VISIT (OUTPATIENT)
Dept: SURGERY | Facility: CLINIC | Age: 61
End: 2021-06-24
Payer: COMMERCIAL

## 2021-06-24 VITALS
HEIGHT: 58 IN | BODY MASS INDEX: 36.53 KG/M2 | WEIGHT: 174 LBS | SYSTOLIC BLOOD PRESSURE: 122 MMHG | DIASTOLIC BLOOD PRESSURE: 78 MMHG

## 2021-06-24 DIAGNOSIS — G96.198 PSEUDOMENINGOCELE: ICD-10-CM

## 2021-06-24 DIAGNOSIS — Z98.2 S/P VP SHUNT: ICD-10-CM

## 2021-06-24 DIAGNOSIS — G93.5 CHIARI I MALFORMATION (HCC): Primary | ICD-10-CM

## 2021-06-24 PROCEDURE — 3008F BODY MASS INDEX DOCD: CPT | Performed by: NEUROLOGICAL SURGERY

## 2021-06-24 PROCEDURE — 99213 OFFICE O/P EST LOW 20 MIN: CPT | Performed by: NEUROLOGICAL SURGERY

## 2021-06-24 PROCEDURE — 3074F SYST BP LT 130 MM HG: CPT | Performed by: NEUROLOGICAL SURGERY

## 2021-06-24 PROCEDURE — 3078F DIAST BP <80 MM HG: CPT | Performed by: NEUROLOGICAL SURGERY

## 2021-06-24 NOTE — PATIENT INSTRUCTIONS
Refill policies:    • Allow 2-3 business days for refills; controlled substances may take longer.   • Contact your pharmacy at least 5 days prior to running out of medication and have them send an electronic request or submit request through the “request re Depending on your insurance carrier, approval may take 3-10 days. It is highly recommended patients contact their insurance carrier directly to determine coverage.   If test is done without insurance authorization, patient may be responsible for the entire edema

## 2021-06-24 NOTE — PROGRESS NOTES
Neurosurgery  Follow up      [address] is a 63year old RH female here in follow up after 3/16/2020 Chiari decompression Dr. Gus Quiles. She did not get CT head. Over Memorial day she had double vision x1 day.  Chiropractor fe She denies any double vision but she feels like her acuity is decreased. She denies a headache. Denies any bladder incontinence. Her walking is okay when she is not dizzy. She has gained 5 pounds since her last visit.     12/20/2020  After changing shun Laid down hard and saw red dot in right eye. Looking up causes increased symptoms. Memory still hard. LT memory seems fine. ST is lacking. Some word finding difficulty. Having hard time learning new things and remembering. HA not as bad as preop. Working. sneezing she gets shortness of breath she has double vision with Valsalva maneuvers her tinnitus is worse. She has uncomfortable cervical spine but no clear pain. She is dropping things are fine motor is decreased in her hands.   She denies any thoracic p   12/10/2020 changed to 1 cm using electronic   1/15/2021 set at 1 cm  3/18/2021 set at 1 cm  6/24/2021 set at 1 cm verified with EP     IMAGING:    CT head 12/5/2020 Ventricles improved. Pseudomeningocele receding.     CT of the head fr tube  -Pseudomeningocele resolved  -ST memory deficits stable  - shunt 11/4/2020 Dr Murray Ortega stable  -left shoulder pain improved    PLAN:  -Follow up 6 months if CT stable  -CT head if stable leave shunt at current setting  -PCP for HTN and leg edema

## 2021-06-24 NOTE — PROGRESS NOTES
Thinks she might need to have setting changed  Off balance  Clear thoughts are difficult  Word finding  Seeing double  Seeing colors  Feels like the fluid being drained, is being retained  Her Chiro mentioned this to her  She has tried OTC diuretic  Would

## 2021-06-29 ENCOUNTER — HOSPITAL ENCOUNTER (OUTPATIENT)
Dept: CT IMAGING | Facility: HOSPITAL | Age: 61
Discharge: HOME | End: 2021-06-29
Attending: PHYSICIAN ASSISTANT
Payer: COMMERCIAL

## 2021-06-29 DIAGNOSIS — G96.198 PSEUDOMENINGOCELE: ICD-10-CM

## 2021-06-29 DIAGNOSIS — Z98.2 S/P VP SHUNT: ICD-10-CM

## 2021-06-29 DIAGNOSIS — G93.5 CHIARI I MALFORMATION (HCC): ICD-10-CM

## 2021-06-29 PROCEDURE — 70450 CT HEAD/BRAIN W/O DYE: CPT | Performed by: PHYSICIAN ASSISTANT

## 2021-07-07 PROBLEM — R01.1 HEART MURMUR: Status: ACTIVE | Noted: 2021-07-07

## 2021-07-07 PROBLEM — R31.9 HEMATURIA, UNSPECIFIED TYPE: Status: ACTIVE | Noted: 2021-07-07

## 2021-07-07 PROBLEM — R60.9 WATER RETENTION: Status: ACTIVE | Noted: 2021-07-07

## 2021-10-12 ENCOUNTER — TELEPHONE (OUTPATIENT)
Dept: SURGERY | Facility: CLINIC | Age: 61
End: 2021-10-12

## 2021-10-12 NOTE — TELEPHONE ENCOUNTER
Contacted pt to r/s 12.16.21 appt with Dr Lizet Holbrook due to him being out of town. Pt states she cannot take off work during the holidays and she needs to see him at the beginning of December.   Pls advise if pt can be worked into Dr [employer] schedule earlier in <-------- Click here to INCLUDE CoVID-19 Discharge Instructions

## 2021-12-02 ENCOUNTER — OFFICE VISIT (OUTPATIENT)
Dept: SURGERY | Facility: CLINIC | Age: 61
End: 2021-12-02
Payer: COMMERCIAL

## 2021-12-02 VITALS — HEART RATE: 72 BPM | DIASTOLIC BLOOD PRESSURE: 74 MMHG | SYSTOLIC BLOOD PRESSURE: 110 MMHG

## 2021-12-02 DIAGNOSIS — G93.5 CHIARI I MALFORMATION (HCC): Primary | ICD-10-CM

## 2021-12-02 DIAGNOSIS — Z98.2 S/P VP SHUNT: ICD-10-CM

## 2021-12-02 PROCEDURE — 3078F DIAST BP <80 MM HG: CPT | Performed by: NEUROLOGICAL SURGERY

## 2021-12-02 PROCEDURE — 99213 OFFICE O/P EST LOW 20 MIN: CPT | Performed by: NEUROLOGICAL SURGERY

## 2021-12-02 PROCEDURE — 3074F SYST BP LT 130 MM HG: CPT | Performed by: NEUROLOGICAL SURGERY

## 2021-12-02 NOTE — PROGRESS NOTES
[facility]    Jamshid Morrison  12/2/2021    Diagnosis: Chiari decompression and subsequent  shunt    Chief complaint: Doing well. Interval History: Continues to gain cognitive function back slowly over time.   Still has

## 2021-12-02 NOTE — PROGRESS NOTES
Pt is here for: 6 month follow up: s/p chiari decompression, NPH    Previous surgery:Chiari decompression 3/16/2020,  shunt placement 11/4/2020    Medications prescribed by our office: None at this time    Last imaging done: CT Brain 6/29/21    Manny

## 2021-12-02 NOTE — PROGRESS NOTES
Patient here for a follow up. Patient states after sneezing, she develops headache and neck pain. BP increases with activity/riding a bike. Patient currently not taking meds for anything, avoids caffeine-drinks decaf coffee.   Patient states she no l

## 2022-07-26 ENCOUNTER — TELEPHONE (OUTPATIENT)
Dept: SURGERY | Facility: CLINIC | Age: 62
End: 2022-07-26

## 2022-07-26 NOTE — TELEPHONE ENCOUNTER
Attempted to contact patient, no answer. Apt for 12/8/22 has been canceled due to Provider no longer being with the Practice. Please assist in rescheduling when call is returned.

## 2022-12-09 PROBLEM — M05.20 RHEUMATOID ARTERITIS (HCC): Status: ACTIVE | Noted: 2020-03-23

## 2022-12-09 PROBLEM — F09 COGNITIVE DISORDER: Status: ACTIVE | Noted: 2020-03-20

## 2022-12-09 PROBLEM — Z79.899 ENCOUNTER FOR LONG-TERM (CURRENT) DRUG USE: Status: RESOLVED | Noted: 2017-05-17 | Resolved: 2022-12-09

## 2022-12-09 PROBLEM — F43.21 ADJUSTMENT DISORDER WITH DEPRESSED MOOD: Status: ACTIVE | Noted: 2020-03-26

## 2022-12-16 ENCOUNTER — OFFICE VISIT (OUTPATIENT)
Dept: SURGERY | Facility: CLINIC | Age: 62
End: 2022-12-16
Payer: COMMERCIAL

## 2022-12-16 DIAGNOSIS — Z98.2 S/P VP SHUNT: ICD-10-CM

## 2022-12-16 DIAGNOSIS — G93.5 CHIARI I MALFORMATION (HCC): Primary | ICD-10-CM

## 2022-12-16 DIAGNOSIS — G91.8 OTHER HYDROCEPHALUS (HCC): ICD-10-CM

## 2022-12-16 NOTE — PROGRESS NOTES
Neurosurgery  Follow up      [address] is a 60year old RH female here in follow up. She states she did drive up to see Dr. Jackelyn Bo in November. In the office he measured her shunts with the analog device and found it was a 2.4. She did not want to have it adjusted as she had a long drive. She comes in today for shunt adjustment. He recommended changing her to 1.5. She states she has been having some symptoms similar to what she had before her last adjustment fullness in her head wooziness when she lays down or stands up. Some instability with walking when she goes from sitting to standing she has to give her self a few seconds. 6/24/2021  after 3/16/2020 Chiari decompression Dr. Hayden [place]. She did not get CT head. Over Memorial day she had double vision x1 day. Chiropractor felt she was \"full of fluid\". She has trouble concentrating. She has been gardening. Bending and digging the next morning had a vise-like HA. She has been unsteady with walking in the yard or on the treadmill. Lifting her dog causes symptoms. Seeing the chiro for her left arm after falling last year. Denies HA currently. \"Fullness in front of head\". BP changes 20 mmHg before and after exercise. Today's MA hx  6/24/21  Thinks she might need to have setting changed  Off balance  Clear thoughts are difficult  Word finding  Seeing double  Seeing colors  Feels like the fluid being drained, is being retained  Her William Rily mentioned this to her  She has tried OTC diuretic  Would like to find something      3/2021  She has changed job positions to reduce stress. Instability is better with gait. Her head still feels weird. Thinking and ST memory still challenging. Ears plugged daily. She is exercise without dizziness. Lifting weights she gets pressure. 1-2/10. Less tinnitus. Bending without falling but some HA and pressure.      S/P MEDTRONIC STRATA PROGRAMMABLE VALVE revision 11/5/2020 of ventricular catheter. 1/15/2021  After last visit she states she did not notice a significant improvement up until the last 5 days. Overall she is better. The pressure is less. When she was shoveling she got dizziness. When she walks on the treadmill and her heart rate and blood pressure goes up she gets dizzy for a while. She gets ear pressure. She gets a high-pitched tinnitus in both ears. Valsalva maneuvers does increase her pressure as well. Overall her dizziness is slowly subsiding. Her fog is improved with long-term she still has some intermittent short-term problems. She denies any double vision but she feels like her acuity is decreased. She denies a headache. Denies any bladder incontinence. Her walking is okay when she is not dizzy. She has gained 5 pounds since her last visit. 12/20/2020  After changing shunt she felt 50-60% better. Getting popping in ear. Still unsteady. Concentrating is hard. Some ringing in her ears. Lying on back and left side causes lightheadedness. Sudden movements cause symptoms. It took a week to see improvement. The last 3-4 days she is feeling worse. Last hx:    He returns in follow-up after having CT of her head. No new complaints    Last history 11/23/2020  She returns in follow-up stating that after last visit she started getting increasing symptoms again she is having pressure and left-sided neck soreness she is having blurry vision photophobia she is getting dizzy when she turns over in bed her ears are full and buzzing and ringing she is having trouble hearing she has a headache on the top of her head in the frontal area when she sneezes she gets increasing headache that last for quite some time she is unsteady with walking caffeine seems to make her dizzy. She has not fallen. She denies any fevers she denies any incontinence. Last history 11/12/2020. Less pressure. Valsalva causes pressure. Overall feeling well. Tinnitus still present. Forgetful.  Medium and LT memory intact. ST still difficult. Last hx:  Memory and comprehension are better. Brain fog resolved. Since going home she has developed HA Tuesday, and feels slipping back slightly. Ringing is present again, it did resolve while drain was in. S/P 10/13/2020 LDT Opening pressure. Elevated at 28.5 cm of water. Last hx:  She is getting increasing HAs. Increased neck pain and tightness with shooting pain. Since last visit she has had optical migraines. HAs began in June. Right shoulder weak. Occipital shock pain. Seeing colors and lights with head movement. Laid down hard and saw red dot in right eye. Looking up causes increased symptoms. Memory still hard. LT memory seems fine. ST is lacking. Some word finding difficulty. Having hard time learning new things and remembering. HA not as bad as preop. Working. Patient saw Dr. Luis Turcios ENT. Right partial vocal cord paralysis. He recommended injection vs observation. She want to observe for now. 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 She has been at home. Gained weight. Right shoulder weak and tight. Leg spasms resolved. Shocks to hands resolved. Vision improved. Hearing better. Chest fluttering and TS band resolved. ST memory and multitasking has declined. No HA. Last hx:  C/O posterior head pressure lying down. Choking. C/O SOB and tightness. Bent over in April to  dog lease and symptoms began. Unsteady gait. Worse coughing and sneezing. Getting nystagmus with eyes. Vertigo. Spells last 1-2 minutes. If she sleeps lying she is worse the next days. Getting worse every month. Legs feel weak. Walking into walls and furniture. Eyes now light sensitivity. Last hx:  here for neurosurgical consultation for recently diagnosed Chiari malformation.   States in April and May of this year she started getting a lot of pressure in the back of her head increased pain with coughing and sneezing and Valsalva she saw her chiropractor and acupuncturist in the try to treat her pain and pressure. She got by for a while she tried muscle relaxers which gave her some relief. She is in today after seeing an orthopedist who ordered an MRI of her cervical spine find a Chiari malformation. She does have occipital pressure and pain. She complains of dizziness, vertigo, lightheadedness, unsteadiness with walking. She is fallen on 3 occasions. Her occipital headache radiates to her right temple. She has decreased concentration. She feels her throat tightens when she has an episode of coughing or sneezing she gets shortness of breath she has double vision with Valsalva maneuvers her tinnitus is worse. She has uncomfortable cervical spine but no clear pain. She is dropping things are fine motor is decreased in her hands. She denies any thoracic pain denies any low back pain. Her legs were weak for several weeks back in the spring she is had some buckling of the legs in the morning especially with her knees. She has had 3 rounds of steroids 40 mg of prednisone seems to help but does not last.  She had a 30 pound weight gain over the last several months. She denies any night sweats night pain. PHYSICAL EXAMINATION:  GENERAL:  Patient is in no acute distress. HEENT:  Normocephalic, atraumatic. SKIN: Warm, dry, without rashes. Posterior scar healed. Cranial incision healed. She has difficulty transitioning from sitting to standing and needs a minute to collect her balance. Her gait is intact. Last exam  NEUROLOGICAL:  This patient is alert and orientated x 3. Speech fluent. Comprehension intact. Negative PD. FM intact. EOMs intact. No nystagmus. face is symmetrical.  Shrug shoulders normally bilaterally. cranial nerves II-XII are grossly intact. Gait intact slight unsteadiness. Rhomberg negative. SL heel raise and knee bend equal. No posterior CS fluid on palpation. 1+ ankle edema.   Uneven skin folds in TS/LS region posteriorly. Upper extremity strength: last exam      Deltoid    Triceps     Biceps        Wrist Extension  Finger extension Thumb Abduction  Thumb adduction Intrinsics   Right         5        5         5          5 5 5 4 5 5-   Left         5        5         5          5 5 5 4 5 4+     Lower extremity strength:  Last exam    Iliopsoas  Hamstrings   Quads    D-flexion P-flexion Eversion   Right       5         5       5         5 5 5   Left       5         5       5         5 5 5         MEDTRONIC STRATA PROGRAMMABLE VALVE , SETTING 2.0, OP 28 cm  No pseudomeningocele. Empty and refills slowly  11/12/2020 set at 2.0 cm  11/23/2020 set at 2.0 cm  11/24/2020 changed to 1.5 cm using a electronic   12/10/2020 changed to 1 cm using electronic   1/15/2021 set at 1 cm  3/18/2021 set at 1 cm  6/24/2021 set at 1 cm verified with EP  12/16/2022 bulb empties and refills. Using the electronic  she said at 1.0     IMAGING:    CT head 12/5/2020 Ventricles improved. Pseudomeningocele receding. CT of the head from 11/23/2020 reported as stable with no subdural hematoma. Comparing 11/23/20 11/6/20 the ventricular size is slightly increased to pseudomeningocele slightly increased. MRI Brain 9/8/2020  Sequelae of interval suboccipital craniectomy is noted. Associated partial reduction of Chiari 1 malformation is noted. Pseudomeningocele at the postoperative site is noted. There is effacement of the 4th ventricle that is new when compared to prior examination. Minimal prominence of the lateral and 3rd ventricles when compared to the prior exam is noted. MRI Cervical spine 11/4/19 shows Chiari malformation with 13 mm tonsillar extension.   She has cervical spondylosis at C4-5 C5-6 and C6-7    MRI brain 12/9/19  there is a Chiari 1.5 malformation, with peglike cerebellar  tonsils, measuring 13 mm below the foramen magnum, including medulla bulb and obex herniation,  associated with retroflexion of the odontoid process, and mild compressive effect on the inferior  midbrain, which is mildly flattened. \"Chiari 1.5 Malformation is defined as a cerebellar tonsillar    MRI TS 12/9/19   Mild multilevel thoracic degenerative disc disease including T11-T12 mild left foraminal stenosis    MRI LS 12/5/19  1. L4-L5 moderate central spinal canal stenosis and L3-L4 mild central spinal canal stenosis. 2. Mild bilateral foraminal stenosis at L3-L4, L4-L5, and L5-S1. 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 We will get a shunt series to look at the positioning of the valve and verify its at 1.0. CT of the head to evaluate if she is over draining or under draining based upon this data will decide on her next settings  -Follow-up in a week with the imaging  -Dr. Maki Garcia was called if she is at 2.0 he recommended going down to 1.5.   But due to conflicting data and settings we will get more data first before changing her patient is in agreement this plan      Calli Aguilera M.S., MARIELLE SMITH [address], [facility] [place], [address] [place] [address] Rd  [phone]

## 2022-12-21 ENCOUNTER — HOSPITAL ENCOUNTER (OUTPATIENT)
Dept: GENERAL RADIOLOGY | Facility: HOSPITAL | Age: 62
Discharge: HOME | End: 2022-12-21
Attending: PHYSICIAN ASSISTANT
Payer: COMMERCIAL

## 2022-12-21 ENCOUNTER — HOSPITAL ENCOUNTER (OUTPATIENT)
Dept: CT IMAGING | Facility: HOSPITAL | Age: 62
Discharge: HOME | End: 2022-12-21
Attending: PHYSICIAN ASSISTANT
Payer: COMMERCIAL

## 2022-12-21 DIAGNOSIS — G91.8 OTHER HYDROCEPHALUS (HCC): ICD-10-CM

## 2022-12-21 DIAGNOSIS — G93.5 CHIARI I MALFORMATION (HCC): ICD-10-CM

## 2022-12-21 DIAGNOSIS — Z98.2 S/P VP SHUNT: ICD-10-CM

## 2022-12-21 PROCEDURE — 70450 CT HEAD/BRAIN W/O DYE: CPT | Performed by: PHYSICIAN ASSISTANT

## 2022-12-21 PROCEDURE — 70360 X-RAY EXAM OF NECK: CPT | Performed by: PHYSICIAN ASSISTANT

## 2022-12-21 PROCEDURE — 70250 X-RAY EXAM OF SKULL: CPT | Performed by: PHYSICIAN ASSISTANT

## 2022-12-21 PROCEDURE — 71045 X-RAY EXAM CHEST 1 VIEW: CPT | Performed by: PHYSICIAN ASSISTANT

## 2022-12-21 PROCEDURE — 74018 RADEX ABDOMEN 1 VIEW: CPT | Performed by: PHYSICIAN ASSISTANT

## 2022-12-28 ENCOUNTER — TELEPHONE (OUTPATIENT)
Dept: SURGERY | Facility: CLINIC | Age: 62
End: 2022-12-28

## 2022-12-28 NOTE — TELEPHONE ENCOUNTER
Rei Boyd from [facility] D#[phone] calling to verify pt phone number on file, states mandie trying to contact pt but unable get hold of her number verified.

## 2022-12-30 ENCOUNTER — OFFICE VISIT (OUTPATIENT)
Dept: SURGERY | Facility: CLINIC | Age: 62
End: 2022-12-30
Payer: COMMERCIAL

## 2022-12-30 VITALS
SYSTOLIC BLOOD PRESSURE: 130 MMHG | BODY MASS INDEX: 37.36 KG/M2 | HEART RATE: 101 BPM | WEIGHT: 178 LBS | DIASTOLIC BLOOD PRESSURE: 82 MMHG | HEIGHT: 58 IN

## 2022-12-30 DIAGNOSIS — Z98.2 S/P VP SHUNT: ICD-10-CM

## 2022-12-30 DIAGNOSIS — G93.5 CHIARI I MALFORMATION (HCC): Primary | ICD-10-CM

## 2022-12-30 DIAGNOSIS — G91.8 OTHER HYDROCEPHALUS (HCC): ICD-10-CM

## 2022-12-30 PROCEDURE — 3079F DIAST BP 80-89 MM HG: CPT | Performed by: PHYSICIAN ASSISTANT

## 2022-12-30 PROCEDURE — 3008F BODY MASS INDEX DOCD: CPT | Performed by: PHYSICIAN ASSISTANT

## 2022-12-30 PROCEDURE — 3075F SYST BP GE 130 - 139MM HG: CPT | Performed by: PHYSICIAN ASSISTANT

## 2022-12-30 PROCEDURE — 99215 OFFICE O/P EST HI 40 MIN: CPT | Performed by: PHYSICIAN ASSISTANT

## 2022-12-30 NOTE — PROGRESS NOTES
Neurosurgery  Follow up      [address] is a 60year old RH female here in follow up. She states she has had some decaf coffee and it may help somewhat. Overall she continues with some slight dizziness or vertigo when her dogs are running around her or when she changes positions from laying to sitting or sitting to standing. She is getting some visual changes in the right eye again that she had before she comes in to review imaging. 12/30/2022  She states she did drive up to see Dr. Sushma Doherty in November. In the office he measured her shunts with the analog device and found it was a 2.4. She did not want to have it adjusted as she had a long drive. She comes in today for shunt adjustment. He recommended changing her to 1.5. She states she has been having some symptoms similar to what she had before her last adjustment fullness in her head wooziness when she lays down or stands up. Some instability with walking when she goes from sitting to standing she has to give her self a few seconds. 6/24/2021  after 3/16/2020 Chiari decompression Dr. Sushma Doherty. She did not get CT head. Over Memorial day she had double vision x1 day. Chiropractor felt she was \"full of fluid\". She has trouble concentrating. She has been gardening. Bending and digging the next morning had a vise-like HA. She has been unsteady with walking in the yard or on the treadmill. Lifting her dog causes symptoms. Seeing the chiro for her left arm after falling last year. Denies HA currently. \"Fullness in front of head\". BP changes 20 mmHg before and after exercise.     Today's MA hx  6/24/21  Thinks she might need to have setting changed  Off balance  Clear thoughts are difficult  Word finding  Seeing double  Seeing colors  Feels like the fluid being drained, is being retained  Her Jesus Palacio mentioned this to her  She has tried OTC diuretic  Would like to find something      3/2021  She has changed job positions to reduce stress. Instability is better with gait. Her head still feels weird. Thinking and ST memory still challenging. Ears plugged daily. She is exercise without dizziness. Lifting weights she gets pressure. 1-2/10. Less tinnitus. Bending without falling but some HA and pressure. S/P [employer] STRATA PROGRAMMABLE VALVE revision 11/5/2020 of ventricular catheter. 1/15/2021  After last visit she states she did not notice a significant improvement up until the last 5 days. Overall she is better. The pressure is less. When she was shoveling she got dizziness. When she walks on the treadmill and her heart rate and blood pressure goes up she gets dizzy for a while. She gets ear pressure. She gets a high-pitched tinnitus in both ears. Valsalva maneuvers does increase her pressure as well. Overall her dizziness is slowly subsiding. Her fog is improved with long-term she still has some intermittent short-term problems. She denies any double vision but she feels like her acuity is decreased. She denies a headache. Denies any bladder incontinence. Her walking is okay when she is not dizzy. She has gained 5 pounds since her last visit. 12/20/2020  After changing shunt she felt 50-60% better. Getting popping in ear. Still unsteady. Concentrating is hard. Some ringing in her ears. Lying on back and left side causes lightheadedness. Sudden movements cause symptoms. It took a week to see improvement. The last 3-4 days she is feeling worse. Last hx:    He returns in follow-up after having CT of her head.   No new complaints    Last history 11/23/2020  She returns in follow-up stating that after last visit she started getting increasing symptoms again she is having pressure and left-sided neck soreness she is having blurry vision photophobia she is getting dizzy when she turns over in bed her ears are full and buzzing and ringing she is having trouble hearing she has a headache on the top of her head in the frontal area when she sneezes she gets increasing headache that last for quite some time she is unsteady with walking caffeine seems to make her dizzy. She has not fallen. She denies any fevers she denies any incontinence. Last history 11/12/2020. Less pressure. Valsalva causes pressure. Overall feeling well. Tinnitus still present. Forgetful. Medium and LT memory intact. ST still difficult. Last hx:  Memory and comprehension are better. Brain fog resolved. Since going home she has developed HA Tuesday, and feels slipping back slightly. Ringing is present again, it did resolve while drain was in. S/P 10/13/2020 LDT Opening pressure. Elevated at 28.5 cm of water. Last hx:  She is getting increasing HAs. Increased neck pain and tightness with shooting pain. Since last visit she has had optical migraines. HAs began in June. Right shoulder weak. Occipital shock pain. Seeing colors and lights with head movement. Laid down hard and saw red dot in right eye. Looking up causes increased symptoms. Memory still hard. LT memory seems fine. ST is lacking. Some word finding difficulty. Having hard time learning new things and remembering. HA not as bad as preop. Working. Patient saw Dr. Jocelynn Garay ENT. Right partial vocal cord paralysis. He recommended injection vs observation. She want to observe for now. 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 She has been at home. Gained weight. Right shoulder weak and tight. Leg spasms resolved. Shocks to hands resolved. Vision improved. Hearing better. Chest fluttering and TS band resolved. ST memory and multitasking has declined. No HA. Last hx:  C/O posterior head pressure lying down. Choking. C/O SOB and tightness. Bent over in April to  dog lease and symptoms began. Unsteady gait. Worse coughing and sneezing. Getting nystagmus with eyes. Vertigo. Spells last 1-2 minutes. If she sleeps lying she is worse the next days.  Getting worse every month. Legs feel weak. Walking into walls and furniture. Eyes now light sensitivity. Last hx:  here for neurosurgical consultation for recently diagnosed Chiari malformation. States in April and May of this year she started getting a lot of pressure in the back of her head increased pain with coughing and sneezing and Valsalva she saw her chiropractor and acupuncturist in the try to treat her pain and pressure. She got by for a while she tried muscle relaxers which gave her some relief. She is in today after seeing an orthopedist who ordered an MRI of her cervical spine find a Chiari malformation. She does have occipital pressure and pain. She complains of dizziness, vertigo, lightheadedness, unsteadiness with walking. She is fallen on 3 occasions. Her occipital headache radiates to her right temple. She has decreased concentration. She feels her throat tightens when she has an episode of coughing or sneezing she gets shortness of breath she has double vision with Valsalva maneuvers her tinnitus is worse. She has uncomfortable cervical spine but no clear pain. She is dropping things are fine motor is decreased in her hands. She denies any thoracic pain denies any low back pain. Her legs were weak for several weeks back in the spring she is had some buckling of the legs in the morning especially with her knees. She has had 3 rounds of steroids 40 mg of prednisone seems to help but does not last.  She had a 30 pound weight gain over the last several months. She denies any night sweats night pain. PHYSICAL EXAMINATION: Last exam  GENERAL:  Patient is in no acute distress. HEENT:  Normocephalic, atraumatic. SKIN: Warm, dry, without rashes. Posterior scar healed. Cranial incision healed. She has difficulty transitioning from sitting to standing and needs a minute to collect her balance. Her gait is intact. Last exam  NEUROLOGICAL:  This patient is alert and orientated x 3. Speech fluent. Comprehension intact. Negative PD. FM intact. EOMs intact. No nystagmus. face is symmetrical.  Shrug shoulders normally bilaterally. cranial nerves II-XII are grossly intact. Gait intact slight unsteadiness. Rhomberg negative. SL heel raise and knee bend equal. No posterior CS fluid on palpation. 1+ ankle edema. Uneven skin folds in TS/LS region posteriorly. Upper extremity strength: last exam      Deltoid    Triceps     Biceps        Wrist Extension  Finger extension Thumb Abduction  Thumb adduction Intrinsics   Right         5        5         5          5 5 5 4 5 5-   Left         5        5         5          5 5 5 4 5 4+     Lower extremity strength:  Last exam    Iliopsoas  Hamstrings   Quads    D-flexion P-flexion Eversion   Right       5         5       5         5 5 5   Left       5         5       5         5 5 5         [employer] STRATA PROGRAMMABLE VALVE , SETTING 2.0, OP 28 cm  No pseudomeningocele. Empty and refills slowly  11/12/2020 set at 2.0 cm  11/23/2020 set at 2.0 cm  11/24/2020 changed to 1.5 cm using a electronic   12/10/2020 changed to 1 cm using electronic   1/15/2021 set at 1 cm  3/18/2021 set at 1 cm  6/24/2021 set at 1 cm verified with EP  12/16/2022 bulb empties and refills slowly. Using the electronic  she said at 1.0  12/30/2022 bulb empties and refills slowly. Using the electronic  she is at 1.0 this was verified with x-rays. Shunt was changed to 1.5 using the analog  magnet. Verified at 1.5 using electronic      IMAGING:  X-ray shunt series 12/21/2022 shows the valve set at 1.0    CT of the head 12/21/2022  Ventricles are well decompressed and may be slightly more decompressed than her June 2021 scan    CT of the head June 2021 ventricles are well decompressed. She was doing well at the setting of 1.0    CT head 12/5/2020 Ventricles improved. Pseudomeningocele receding.     CT of the head from 11/23/2020 reported as stable with no subdural hematoma. Comparing 11/23/20 11/6/20 the ventricular size is slightly increased to pseudomeningocele slightly increased. MRI Brain 9/8/2020  Sequelae of interval suboccipital craniectomy is noted. Associated partial reduction of Chiari 1 malformation is noted. Pseudomeningocele at the postoperative site is noted. There is effacement of the 4th ventricle that is new when compared to prior examination. Minimal prominence of the lateral and 3rd ventricles when compared to the prior exam is noted. MRI Cervical spine 11/4/19 shows Chiari malformation with 13 mm tonsillar extension. She has cervical spondylosis at C4-5 C5-6 and C6-7    MRI brain 12/9/19  there is a Chiari 1.5 malformation, with peglike cerebellar  tonsils, measuring 13 mm below the foramen magnum, including medulla bulb and obex herniation,  associated with retroflexion of the odontoid process, and mild compressive effect on the inferior  midbrain, which is mildly flattened. \"Chiari 1.5 Malformation is defined as a cerebellar tonsillar    MRI TS 12/9/19   Mild multilevel thoracic degenerative disc disease including T11-T12 mild left foraminal stenosis    MRI LS 12/5/19  1. L4-L5 moderate central spinal canal stenosis and L3-L4 mild central spinal canal stenosis. 2. Mild bilateral foraminal stenosis at L3-L4, L4-L5, and L5-S1. 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 Patient is in agreement of going back to 1.5 and following up in 4 weeks if she has any increased symptoms then with her back to 1.0 and get a repeat CT of the head. The fact that she seems to be getting spinal headaches and symptoms consistent with over drainage she understands this is a trial and error type of technique.  -Follow-up in 4 weeks for reevaluation unless she is worse then sooner  -If she has no changes in symptoms then recommend she follow-up with Dr. Kathryn Pollock or with one of our other neurosurgeons  -Her images were reviewed her questions were answered    -In addition she states after getting a CT of the head her insurance company denied payment because the order did not say it was medically necessary for [address]. She is going to call the billing office to discuss it. As our current central scheduling has been booking imaging without prior Auth's  -Also she states she is contact [employer] rep to try and get us a new analog       TRichard Solis M.S., MARIELLE SMITH [address], [facility] [place], [address] [place] [address] Rd  [phone]

## 2023-01-01 ENCOUNTER — PATIENT MESSAGE (OUTPATIENT)
Dept: SURGERY | Facility: CLINIC | Age: 63
End: 2023-01-01

## 2023-01-03 ENCOUNTER — TELEPHONE (OUTPATIENT)
Dept: ADMINISTRATIVE | Facility: HOSPITAL | Age: 63
End: 2023-01-03

## 2023-01-03 NOTE — TELEPHONE ENCOUNTER
From: John Toribio  To: [facility] Hood [facility]  Sent: 1/1/2023 7:53 PM CST  Subject: [employer] rep    Hi,    The assigned rep is Buzz Mathur [phone]. However, he has been out of the office. So I contacted the , Lina Heimlich at [phone]. His email is [email or web address]. Please reach out to him to get a new analog device to check shunt setting. You and I should have working equipment available. Thank you for your help.   Sammi Priest

## 2023-01-03 NOTE — TELEPHONE ENCOUNTER
Called physician support and spoke to [address] [place] [address]. Was informed that a \"reconsideration\" can be submitted stating why the CT scan had to be done at [facility] specifically. Chart reviewed in depth and letter drafted to be submitted for reconsideration. Once letter has been signed, it will need to be faxed to [phone]. The cover page must state Attn: case #[id number] and specifically say \"reconsideration\". Letter routed to provider for editing and signature. If provider would rather perform a peer to peer, they can call [phone]. Yfn [place]

## 2023-01-03 NOTE — TELEPHONE ENCOUNTER
Upon Epic review, patient completed testing on 12/21/22.   Please see TE from [address] [place], [place] [address]yme Team.

## 2023-01-05 NOTE — TELEPHONE ENCOUNTER
Faxed Reconsideration Letter to [employer] at [phone] as indicated in notes listed below.      Confirmation of Fax Received at [phone]

## 2023-01-10 NOTE — TELEPHONE ENCOUNTER
Noted the pt responded to the mc message from the provider. Routed to the provider to review symptoms and provide feedback.

## 2023-01-27 ENCOUNTER — OFFICE VISIT (OUTPATIENT)
Dept: SURGERY | Facility: CLINIC | Age: 63
End: 2023-01-27
Payer: COMMERCIAL

## 2023-01-27 DIAGNOSIS — G93.5 CHIARI I MALFORMATION (HCC): Primary | ICD-10-CM

## 2023-01-27 DIAGNOSIS — Z98.2 S/P VP SHUNT: ICD-10-CM

## 2023-01-27 PROBLEM — M79.671 RIGHT FOOT PAIN: Status: ACTIVE | Noted: 2023-01-05

## 2023-01-27 PROBLEM — G89.29 CHRONIC PAIN OF RIGHT KNEE: Status: ACTIVE | Noted: 2023-01-05

## 2023-01-27 PROBLEM — M25.561 CHRONIC PAIN OF RIGHT KNEE: Status: ACTIVE | Noted: 2023-01-05

## 2023-01-27 PROBLEM — M47.26 OSTEOARTHRITIS OF SPINE WITH RADICULOPATHY, LUMBAR REGION: Status: ACTIVE | Noted: 2022-12-29

## 2023-01-27 NOTE — PROGRESS NOTES
Neurosurgery  Follow up      [address] is a 60year old RH female here in follow up. After changing her shunt to 1.5 she states she was little wobbly leaving the office. She got a circular headache over the top of her skull for about a week with some nausea and it was quite painful after that time things seem to improve. Now she feels like her gait is improved quite a bit the pressure has consolidated down to 1 area she is tolerating a little bit of caffeine. She is tolerating physical therapy she may have decrease photosensitivity to the lights. She is able to stand with a tandem stance. She had 1 fall where she fell back into her couch but she denies any injury she denies any headache. She is very pleased with the changes in the setting. She would like to keep it at the setting for now. 12/30/2022  She states she has had some decaf coffee and it may help somewhat. Overall she continues with some slight dizziness or vertigo when her dogs are running around her or when she changes positions from laying to sitting or sitting to standing. She is getting some visual changes in the right eye again that she had before she comes in to review imaging. 12/30/2022  She states she did drive up to see Dr. Jacki Peoples in November. In the office he measured her shunts with the analog device and found it was a 2.4. She did not want to have it adjusted as she had a long drive. She comes in today for shunt adjustment. He recommended changing her to 1.5. She states she has been having some symptoms similar to what she had before her last adjustment fullness in her head wooziness when she lays down or stands up. Some instability with walking when she goes from sitting to standing she has to give her self a few seconds. 6/24/2021  after 3/16/2020 Chiari decompression Dr. Jacki Peoples. She did not get CT head. Over Memorial day she had double vision x1 day.  Chiropractor felt she was \"full of fluid\". She has trouble concentrating. She has been gardening. Bending and digging the next morning had a vise-like HA. She has been unsteady with walking in the yard or on the treadmill. Lifting her dog causes symptoms. Seeing the chiro for her left arm after falling last year. Denies HA currently. \"Fullness in front of head\". BP changes 20 mmHg before and after exercise. Today's MA hx  6/24/21  Thinks she might need to have setting changed  Off balance  Clear thoughts are difficult  Word finding  Seeing double  Seeing colors  Feels like the fluid being drained, is being retained  Her Keren Ferguson mentioned this to her  She has tried OTC diuretic  Would like to find something      3/2021  She has changed job positions to reduce stress. Instability is better with gait. Her head still feels weird. Thinking and ST memory still challenging. Ears plugged daily. She is exercise without dizziness. Lifting weights she gets pressure. 1-2/10. Less tinnitus. Bending without falling but some HA and pressure. S/P MEDTRONIC STRATA PROGRAMMABLE VALVE revision 11/5/2020 of ventricular catheter. 1/15/2021  After last visit she states she did not notice a significant improvement up until the last 5 days. Overall she is better. The pressure is less. When she was shoveling she got dizziness. When she walks on the treadmill and her heart rate and blood pressure goes up she gets dizzy for a while. She gets ear pressure. She gets a high-pitched tinnitus in both ears. Valsalva maneuvers does increase her pressure as well. Overall her dizziness is slowly subsiding. Her fog is improved with long-term she still has some intermittent short-term problems. She denies any double vision but she feels like her acuity is decreased. She denies a headache. Denies any bladder incontinence. Her walking is okay when she is not dizzy. She has gained 5 pounds since her last visit.     12/20/2020  After changing shunt she felt 50-60% better. Getting popping in ear. Still unsteady. Concentrating is hard. Some ringing in her ears. Lying on back and left side causes lightheadedness. Sudden movements cause symptoms. It took a week to see improvement. The last 3-4 days she is feeling worse. Last hx:    He returns in follow-up after having CT of her head. No new complaints    Last history 11/23/2020  She returns in follow-up stating that after last visit she started getting increasing symptoms again she is having pressure and left-sided neck soreness she is having blurry vision photophobia she is getting dizzy when she turns over in bed her ears are full and buzzing and ringing she is having trouble hearing she has a headache on the top of her head in the frontal area when she sneezes she gets increasing headache that last for quite some time she is unsteady with walking caffeine seems to make her dizzy. She has not fallen. She denies any fevers she denies any incontinence. Last history 11/12/2020. Less pressure. Valsalva causes pressure. Overall feeling well. Tinnitus still present. Forgetful. Medium and LT memory intact. ST still difficult. Last hx:  Memory and comprehension are better. Brain fog resolved. Since going home she has developed HA Tuesday, and feels slipping back slightly. Ringing is present again, it did resolve while drain was in. S/P 10/13/2020 LDT Opening pressure. Elevated at 28.5 cm of water. Last hx:  She is getting increasing HAs. Increased neck pain and tightness with shooting pain. Since last visit she has had optical migraines. HAs began in June. Right shoulder weak. Occipital shock pain. Seeing colors and lights with head movement. Laid down hard and saw red dot in right eye. Looking up causes increased symptoms. Memory still hard. LT memory seems fine. ST is lacking. Some word finding difficulty. Having hard time learning new things and remembering. HA not as bad as preop. Working.       Patient saw Dr. Graham Aguilar ENT. Right partial vocal cord paralysis. He recommended injection vs observation. She want to observe for now. 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 She has been at home. Gained weight. Right shoulder weak and tight. Leg spasms resolved. Shocks to hands resolved. Vision improved. Hearing better. Chest fluttering and TS band resolved. ST memory and multitasking has declined. No HA. Last hx:  C/O posterior head pressure lying down. Choking. C/O SOB and tightness. Bent over in April to  dog lease and symptoms began. Unsteady gait. Worse coughing and sneezing. Getting nystagmus with eyes. Vertigo. Spells last 1-2 minutes. If she sleeps lying she is worse the next days. Getting worse every month. Legs feel weak. Walking into walls and furniture. Eyes now light sensitivity. Last hx:  here for neurosurgical consultation for recently diagnosed Chiari malformation. States in April and May of this year she started getting a lot of pressure in the back of her head increased pain with coughing and sneezing and Valsalva she saw her chiropractor and acupuncturist in the try to treat her pain and pressure. She got by for a while she tried muscle relaxers which gave her some relief. She is in today after seeing an orthopedist who ordered an MRI of her cervical spine find a Chiari malformation. She does have occipital pressure and pain. She complains of dizziness, vertigo, lightheadedness, unsteadiness with walking. She is fallen on 3 occasions. Her occipital headache radiates to her right temple. She has decreased concentration. She feels her throat tightens when she has an episode of coughing or sneezing she gets shortness of breath she has double vision with Valsalva maneuvers her tinnitus is worse. She has uncomfortable cervical spine but no clear pain. She is dropping things are fine motor is decreased in her hands.   She denies any thoracic pain denies any low back pain. Her legs were weak for several weeks back in the spring she is had some buckling of the legs in the morning especially with her knees. She has had 3 rounds of steroids 40 mg of prednisone seems to help but does not last.  She had a 30 pound weight gain over the last several months. She denies any night sweats night pain. PHYSICAL EXAMINATION:  GENERAL:  Patient is in no acute distress. HEENT:  Normocephalic, atraumatic. SKIN: Warm, dry, without rashes. She has no difficulty transitioning from sitting to standing and no longer needs a minute to collect her balance. Her gait is intact. She is able to bend over to the floor and  her gloves without unsteadiness she feels just a little bit of what she had at her last appointment before the change. EOMs are intact. Face is symmetric. Cranial nerves II through XII are intact negative pronator drift negative Romberg.  empties and refills about the same as last time. Last exam  Upper extremity strength: last exam      Deltoid    Triceps     Biceps        Wrist Extension  Finger extension Thumb Abduction  Thumb adduction Intrinsics   Right         5        5         5          5 5 5 4 5 5-   Left         5        5         5          5 5 5 4 5 4+     Lower extremity strength:  Last exam    Iliopsoas  Hamstrings   Quads    D-flexion P-flexion Eversion   Right       5         5       5         5 5 5   Left       5         5       5         5 5 5         MEDTRONIC STRATA PROGRAMMABLE VALVE , SETTING 2.0, OP 28 cm  No pseudomeningocele. Empty and refills slowly  11/12/2020 set at 2.0 cm  11/23/2020 set at 2.0 cm  11/24/2020 changed to 1.5 cm using a electronic   12/10/2020 changed to 1 cm using electronic   1/15/2021 set at 1 cm  3/18/2021 set at 1 cm  6/24/2021 set at 1 cm verified with EP  12/16/2022 bulb empties and refills slowly.   Using the electronic  she said at 1.0  12/30/2022 bulb empties and refills slowly. Using the electronic  she is at 1.0 this was verified with x-rays. Shunt was changed to 1.5 using the analog  magnet. Verified at 1.5 using electronic   1/27/2023 bulb empties and refills slowly. Set at 1.5 verify with digital       IMAGING:  X-ray shunt series 12/21/2022 shows the valve set at 1.0    CT of the head 12/21/2022  Ventricles are well decompressed and may be slightly more decompressed than her June 2021 scan    CT of the head June 2021 ventricles are well decompressed. She was doing well at the setting of 1.0    CT head 12/5/2020 Ventricles improved. Pseudomeningocele receding. CT of the head from 11/23/2020 reported as stable with no subdural hematoma. Comparing 11/23/20 11/6/20 the ventricular size is slightly increased to pseudomeningocele slightly increased. MRI Brain 9/8/2020  Sequelae of interval suboccipital craniectomy is noted. Associated partial reduction of Chiari 1 malformation is noted. Pseudomeningocele at the postoperative site is noted. There is effacement of the 4th ventricle that is new when compared to prior examination. Minimal prominence of the lateral and 3rd ventricles when compared to the prior exam is noted. MRI Cervical spine 11/4/19 shows Chiari malformation with 13 mm tonsillar extension. She has cervical spondylosis at C4-5 C5-6 and C6-7    MRI brain 12/9/19  there is a Chiari 1.5 malformation, with peglike cerebellar  tonsils, measuring 13 mm below the foramen magnum, including medulla bulb and obex herniation,  associated with retroflexion of the odontoid process, and mild compressive effect on the inferior  midbrain, which is mildly flattened. \"Chiari 1.5 Malformation is defined as a cerebellar tonsillar    MRI TS 12/9/19   Mild multilevel thoracic degenerative disc disease including T11-T12 mild left foraminal stenosis    MRI LS 12/5/19  1.  L4-L5 moderate central spinal canal stenosis and L3-L4 mild central spinal canal stenosis. 2. Mild bilateral foraminal stenosis at L3-L4, L4-L5, and L5-S1. 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  Ty Guerra M.S., PA-C  [address], [address] [place] [address] Ty Frazier, [address]  [phone]

## 2023-05-08 ENCOUNTER — TELEPHONE (OUTPATIENT)
Dept: SURGERY | Facility: CLINIC | Age: 63
End: 2023-05-08

## 2023-05-08 DIAGNOSIS — M54.2 NECK PAIN: ICD-10-CM

## 2023-05-08 DIAGNOSIS — M47.812 CERVICAL SPONDYLOSIS: Primary | ICD-10-CM

## 2023-05-08 NOTE — TELEPHONE ENCOUNTER
PT calling to speak to MICHELE Vasquez regarding changes made to Shunt. Asked if she wanted to speak to a RN but stated she wanted to speak to MICHELE Bray since he made the changes. Requesting a call back asap.

## 2023-05-09 NOTE — TELEPHONE ENCOUNTER
Patient called. She is in physical therapy. She continues to have cervical pain but has not started therapy for this. She is having therapy for her lower back which is helping. Physical therapy ordered for cervical therapy was generated. She continues to monitor her symptoms from her shunt. She has a few days where she is having some symptoms but overall she thinks she is improved since January with the shunt change.   I recommend she follow-up with Soha Rodriguez PA-C regarding the shunt    Her questions were answered

## 2023-05-10 ENCOUNTER — PATIENT MESSAGE (OUTPATIENT)
Dept: SURGERY | Facility: CLINIC | Age: 63
End: 2023-05-10

## 2023-05-10 DIAGNOSIS — Z98.2 S/P VP SHUNT: ICD-10-CM

## 2023-05-10 DIAGNOSIS — M47.812 CERVICAL SPONDYLOSIS: Primary | ICD-10-CM

## 2023-05-10 DIAGNOSIS — G93.5 CHIARI I MALFORMATION (HCC): ICD-10-CM

## 2023-05-10 DIAGNOSIS — Z98.890 STATUS POST CRANIECTOMY: ICD-10-CM

## 2023-05-10 DIAGNOSIS — M54.2 NECK PAIN: ICD-10-CM

## 2023-05-11 NOTE — TELEPHONE ENCOUNTER
From: Adryan Banerjee  To: ALLY Cummins  Sent: 5/10/2023 8:28 AM CDT  Subject: PT Order    Gertrude Choudhury, Maybe I was looking in the wrong place on [facility], but I didn't see an order for PT. Are you able to enter one or should I plan on seeing you in June?     César Will

## 2023-05-12 NOTE — TELEPHONE ENCOUNTER
Noted the patient request listed below     Routed to the Providers to place orders for External PT per pt request.

## 2023-05-16 ENCOUNTER — TELEPHONE (OUTPATIENT)
Dept: PHYSICAL THERAPY | Facility: HOSPITAL | Age: 63
End: 2023-05-16

## 2024-08-09 NOTE — PROGRESS NOTES
Patient received in bed. Denies pain. No use of pain medication overnight. Verbalized wanting to go home. States the hospital is not designed for people her size and she would just be more comfortable at home. Incisions CDI.   Ambulating without assista Normal/Making sense

## (undated) DEVICE — SHEET, T, LAPAROTOMY, STERILE: Brand: MEDLINE

## (undated) DEVICE — OIL CARTRIDGE: Brand: CORE, MAESTRO

## (undated) DEVICE — SUTURE VICRYL 0 CT-1

## (undated) DEVICE — TRANSPOSAL ULTRAFLEX DUO/QUAD ULTRA CART MANIFOLD

## (undated) DEVICE — SOLUTION ANSEP 70% ISOPRPNL

## (undated) DEVICE — SUTURE VICRYL PLUS 3-0 PS-2

## (undated) DEVICE — FLOSEAL HEMOSTATIC MATRIX, 5ML: Brand: FLOSEAL HEMOSTATIC MATRIX

## (undated) DEVICE — ALCOHOL 70% 4 OZ

## (undated) DEVICE — 5.0MM PRECISION ROUND

## (undated) DEVICE — SUTURE NUROLON 4-0 TF

## (undated) DEVICE — STYLET 9735428 2 COIL SINGLE PACKAGE

## (undated) DEVICE — UNDYED BRAIDED (POLYGLACTIN 910), SYNTHETIC ABSORBABLE SUTURE: Brand: COATED VICRYL

## (undated) DEVICE — DRAPE MICROSCOPE NEURO PENTERO

## (undated) DEVICE — SOLUTION SURG DURA PREP HAZMAT

## (undated) DEVICE — STERILE POLYISOPRENE POWDER-FREE SURGICAL GLOVES: Brand: PROTEXIS

## (undated) DEVICE — LIGHT HANDLE

## (undated) DEVICE — DIFFUSER: Brand: CORE, MAESTRO

## (undated) DEVICE — CASED DISP BIPOLAR CORD

## (undated) DEVICE — STERILE SYNTHETIC POLYISOPRENE POWDER-FREE SURGICAL GLOVES WITH HYDROGEL COATING: Brand: PROTEXIS

## (undated) DEVICE — LAMINECTOMY CDS: Brand: MEDLINE INDUSTRIES, INC.

## (undated) DEVICE — SOL  .9 1000ML BTL

## (undated) DEVICE — SHEET, DRAPE, SPLIT, STERILE: Brand: MEDLINE

## (undated) DEVICE — SPECIMEN CONTAINER,POSITIVE SEAL INDICATOR, OR PACKAGED: Brand: PRECISION

## (undated) DEVICE — CODMAN® DISPOSABLE PERFORATOR 14MM: Brand: CODMAN®

## (undated) DEVICE — SUTURE VICRYL 2-0 CP-2

## (undated) DEVICE — VIOLET BRAIDED (POLYGLACTIN 910), SYNTHETIC ABSORBABLE SUTURE: Brand: COATED VICRYL

## (undated) DEVICE — SUTURE VICRYL 3-0 RB-1

## (undated) DEVICE — #11 STERILE BLADE: Brand: POLYMER COATED BLADES

## (undated) DEVICE — PREMIUM WET SKIN PREP TRAY: Brand: MEDLINE INDUSTRIES, INC.

## (undated) DEVICE — 3M™ IOBAN™ 2 ANTIMICROBIAL INCISE DRAPE 6650EZ: Brand: IOBAN™ 2

## (undated) DEVICE — SUTURE VICRYL 0 CT-2

## (undated) DEVICE — POINTER 9735317 TRACER AXIEM PACKAGED: Brand: TRACER POINTER

## (undated) DEVICE — MARKER SKIN PREP RESIST STRL

## (undated) DEVICE — KIT NEUROSURG SHUNT CSF NONINV

## (undated) DEVICE — KENDALL SCD EXPRESS SLEEVES, KNEE LENGTH, MEDIUM: Brand: KENDALL SCD

## (undated) DEVICE — GAUZE SPONGES,12 PLY: Brand: CURITY

## (undated) DEVICE — DRESSING AQUACEL AG 3.5 X 6

## (undated) DEVICE — MEDI-VAC NON-CONDUCTIVE SUCTION TUBING: Brand: CARDINAL HEALTH

## (undated) DEVICE — PROXIMATE SKIN STAPLERS (35 WIDE) CONTAINS 35 STAINLESS STEEL STAPLES (FIXED HEAD): Brand: PROXIMATE

## (undated) DEVICE — SUTURE SILK 0

## (undated) DEVICE — DERMABOND LIQUID ADHESIVE

## (undated) DEVICE — 1910 FOAM BLOCK NEEDLE COUNTER: Brand: DEVON

## (undated) DEVICE — MINI LAP PACK-LF: Brand: MEDLINE INDUSTRIES, INC.

## (undated) DEVICE — SUTURE VICRYL PLUS 4-0 PS-2

## (undated) DEVICE — 1016 S-DRAPE IRRIG POUCH 10/BOX: Brand: STERI-DRAPE™

## (undated) DEVICE — HEMOCLIP HORIZON MED 002200

## (undated) DEVICE — COVER,MAYO STAND,STERILE: Brand: MEDLINE

## (undated) DEVICE — SUTURE VICRYL 2-0 CT-2

## (undated) DEVICE — BLUNT CANNULA: Brand: MONOJECT

## (undated) DEVICE — CRANIOTOMY CDS: Brand: MEDLINE INDUSTRIES, INC.

## (undated) DEVICE — SUTURE ETHILON 3-0 PS-1

## (undated) DEVICE — REM POLYHESIVE ADULT PATIENT RETURN ELECTRODE: Brand: VALLEYLAB

## (undated) DEVICE — PAD SACRAL SPAN AID

## (undated) DEVICE — DRAPE,LAPAROTOMY,PCH,STERILE: Brand: MEDLINE

## (undated) DEVICE — 3M(TM) MEDIPORE(TM) +PAD SOFT CLOTH ADHESIVE WOUND DRESSING 3569: Brand: 3M™ MEDIPORE™

## (undated) DEVICE — DRESSING AQUACEL AG 3.5X3.75

## (undated) DEVICE — SUTURE VICRYL 3-0 SH

## (undated) DEVICE — CAUTERY PENCIL

## (undated) DEVICE — LIGACLIP EXTRA LIGATING CLIP CARTRIDGES: 6 TITANIUM CLIPS/ CARTRIDGE (SMALL): Brand: LIGACLIP

## (undated) DEVICE — D-58 TAPERED ROUTER

## (undated) DEVICE — SPONGE RAYTEC 4X4 RF DETECT

## (undated) NOTE — LETTER
Date: 11/22/2019    Patient Name: Christie Velasquez          To Whom it may concern: This letter has been written at the patient's request. The above patient was seen at the [facility] for treatment of a medical condition.     This patient

## (undated) NOTE — LETTER
5/1/2020    ARIAS Machado [address]    She is undergoing physical therapy postoperatively from a suboccipital craniotomy for Chiari decompression. She will participate in physical therapy the whole month of May.   She will see us for determination of retu

## (undated) NOTE — IP AVS SNAPSHOT
[address]            (For Outpatient Use Only) Initial Admit Date: 3/16/2020   Inpt/Obs Admit Date: Inpt: 3/16/20 / Obs: N/A   Discharge Date:    Andrew Kleindale:  [de-identified]   MRN: [de-identified]   CSN: [id number]   CEID: [id number] Subscriber ID:  Pt Rel to Subscriber:    Hospital Account Financial Class: Managed Care    March 19, 2020

## (undated) NOTE — LETTER
[address]ada M[address]za [address] [facility] [address], [address] [facility]  Authorization for Surgical Operation and Procedure     Date:11/5/20                                                                                              Time:1264  1.  I hereby authorize 4.   Should the need arise during my operation or immediate post-operative period, I also consent to the administration of blood and/or blood products.   Further, I understand that despite careful testing and screening of blood or blood products by corazon 8.   I recognize that in the event my procedure results in extended X-Ray/fluoroscopy time, I may develop a skin reaction. 9.  If I have a Do Not Attempt Resuscitation (DNAR) order in place, that status will be suspended while in the operating room, proc 1. Sara DONG agree to be cared for by an anesthesiologist, who is specially trained to monitor me and give me medicine to put me to sleep or keep me comfortable during my procedure    I understand that my anesthesiologist is not an employee or 5. My doctor has explained to me other choices available to me for my care (alternatives).   6. Pregnant Patients (“epidural”):  I understand that the risks of having an epidural (medicine given into my back to help control pain during labor), include itchi

## (undated) NOTE — LETTER
Date: 1-3-23    Patient: Abril Blair  : 8-  Member ID #: [id number]    RE: RECONSIDERATION for Case # [id number] (CPT 50537---JB Brain or Head)    To Whom it May Concern,     I am writing to appeal the denial of the facility in which a CT brain or head (CPT 05252) was performed on patient Merlene Denton ([id number]). The CT scan was performed on 22, prior to notification of the denial of the facility. The procedure was approved but the facility [facility]) was denied. Please accept this reconsideration request as it was necessary for the patient to have the imaging done at [facility].      I have been treating Abril Blair at [facility] since 2019 for diagnosis of Chiari Malformation. Her symptoms included, but are not limited to, gait abnormality, cognitive changes, dizziness, head and neck pain, and decreased fine motor abilities. On 3-, she underwent a craniotomy for decompression of Chiari Malformation at [facility].  Despite surgical intervention, the patient's symptoms persisted. She was having occular migraines, vision changes and severe pain and weakness. She was diagnosed with a Pseudomeningocele. On 2020, she had a ventricular-peritoneal shunt insertion at [facility].      She requires serial monitoring with CT brain scans every 6 months and as needed for any neurological or physical changes occur. She is being and has been closely followed by our practice. Obtaining the CT scans at [facility] is needed for continuity of care and obtaining results in real time with recent comparisons by a radiologist.  If abnormalities are seen which require acute intervention (shunt adjustments), it would be essential for these results to be provided to our physicians as soon as possible.       The decision by the patient's insurance company to deny coverage of the facility constitutes an act of withholding treatment that can potentially benefit a patient and therefore we would like to appeal for reconsideration of the recommended treatment.     Sincerely,    Rosa Schumacher M.S., MARIELLE, [address]  [address] [facility], [facility] [place], [address] [place] [address] Rd  [phone]

## (undated) NOTE — LETTER
Amada Mendoza [address]  [address] [facility] [address], [address] [facility]  Authorization for Surgical Operation and Procedure     Date:___________                                                                                                         Time:__________ 4.   Should the need arise during my operation or immediate post-operative period, I also consent to the administration of blood and/or blood products.   Further, I understand that despite careful testing and screening of blood or blood products by corazon 8.   I recognize that in the event my procedure results in extended X-Ray/fluoroscopy time, I may develop a skin reaction. 9.  If I have a Do Not Attempt Resuscitation (DNAR) order in place, that status will be suspended while in the operating room, proc 1. [place] agree to be cared for by an anesthesiologist, who is specially trained to monitor me and give me medicine to put me to sleep or keep me comfortable during my procedure    I understand that my anesthesiologist is not an employee or 5. My doctor has explained to me other choices available to me for my care (alternatives).   6. Pregnant Patients (“epidural”):  I understand that the risks of having an epidural (medicine given into my back to help control pain during labor), include itchi

## (undated) NOTE — IP AVS SNAPSHOT
Patient Demographics     Address  [address] [place] [id number] Phone  [phone] [facility])  [phone] (Work)  [phone] (Mobile) *Preferred* E-mail Address  [email or web address]      Emergency Contact(s)     Name Relation Home Work Fidencio Pina pneumonia, venous thrombosis, or pulmonary embolism  ? No lifting more than 10 lbs ( a gallon of milk)  ? Avoid straining or bending at the waist  ? No heavy exercising until cleared by surgeon  ?  You may resume sexual activity when you feel well enough  W ? If your incision is glued, you may leave the wound open to air. Occasionally wounds may have steri-strips in place. These should fall off spontaneously after approximately 5 days    ? You may shower on your 2nd or 3rd day after surgery.    Do not take Commonly known as:  Ventolin HFA      Inhale 2 puffs into the lungs every 6 (six) hours as needed for Wheezing.    Viktor Martin MD         Butalbital-APAP-Caffeine -56 MG Tabs  Commonly known as:  FIORICET      Take 1 tablet by mouth every 4 (four) ho Order ID Medication Name Action Time Action Reason Comments    [id number] Metoclopramide HCl (REGLAN) injection 10 mg 03/18/20 1457 Given      [id number] Potassium Chloride ER (K-DUR M20) CR tab 40 mEq 03/19/20 0938 Given      [id number] Senna (SENOKOT) tab Lab - Abbreviation Name Director Address Valid Date Range    NILDA/ [place] [address] [id number] 02/03/16 1201 - Present            Microbiology Results (All)     Procedure Component Value Units D an orthopedist who ordered an MRI of her cervical spine find a Chiari malformation.     She does have occipital pressure and pain. She complains of dizziness, vertigo, lightheadedness, unsteadiness with walking. She is fallen on 3 occasions.   Her occipit and mother; Hypertension in her father; Musculo-skelatal Disorder in her father and mother.     SOCIAL HISTORY:   reports that she has never smoked. She has never used smokeless tobacco. She reports current alcohol use.  She reports that she does not use dr Soln, Inhale 2 puffs into the lungs every 6 (six) hours as needed for Wheezing., Disp: 1 Inhaler, Rfl: 0  Lifitegrast (Lonnie Grey OP), Apply to eye., Disp: , Rfl:      No current facility-administered medications on file prior to visit.         REVIEW OF SYSTE Left       5         5       5         5 5 5      Reflexes DTRs:1/4 LE, 3/4 UE        IMAGING:  MRI Cervical spine 11/4/19 shows Chiari malformation with 13 mm tonsillar extension.   She has cervical spondylosis at C4-5 C5-6 and C6-7     MRI brain 12/9/19 [phone]              The above referenced H&P was reviewed by Kevin Girard PA-C on 03/16/20, the patient was examined and no significant changes have occurred in the patient's condition since the H&P was performed.  She states no chest pain, shortne PCP:[CY.1] Meena Mujica. 2]    Reason for Consultation: Medical Management    Attempted to see pt earlier, using bathroom.      History of Present Illness: Patient is a[CY.3 60year old[CY.2] female with PMH including but not limited to GERD, as Epinephrine             SWELLING    Comment:Swelling and redness at injection site  Mold                    ASTHMA  Sulfa Antibiotics       Dyspnea  Corn                    OTHER (SEE COMMENTS)    Comment:lightheadedness  Egg                     OTHER (SEE MCV 90.7 87.8   .0 326.0       Recent Labs   Lab 03/17/20  0403      K 3.8      CO2 22.0   BUN 8   CREATSERUM 0.67   *   CA 7.7*   MG 2.0   PHOS 2.7       No results for input(s): ALT, AST, ALB, AMYLASE, LIPASE, LDH in the last 16 ASSESSMENT / PLAN:[CY.3  60year old[CY.2] female with PMH including but not limited to GERD, asthma, RA, LBP, who p/t EH for scheduled surgery by Dr. Natty Meyer. # Chiari I malformation; Gait abnormality; Cognitive change; Weakness of both legs;  Vertigo; Filed:  3/17/2020 10:42 AM Date of Service:  3/17/2020 10:25 AM Status:  Signed    :  Sade Sy PT (Physical Therapist)         PHYSICAL THERAPY EVALUATION - INPATIENT     Room Number: 7990/9648-R  Evaluation Date: 3/17/2020  Type of Evaluati Prior Level of [place]: The pt reports that typically she is independent with ambulation and I/ADL's. Pt works as a . Pt reports since being diagnosed she has had bouts of vertigo causing multiple falls.   Additionally, she states leg                                                                 2                       NEUROLOGICAL FINDINGS[NL.1]  Neurological Findings: Coordination - Heel to Shin     Coordination - Heel to Shin: Symmetrical[NL.2]             ACTIVITY TOLERANCE  O Patient End of Session: Up in chair;Call light within reach; Needs met;RN aware of session/findings; All patient questions and concerns addressed; Family present[NL.2]    ASSESSMENT   Patient is a[NL.3 60year old[NL.2] female admitted on 3/16/2020 for crani Goal #1 Patient is able to demonstrate supine - sit EOB @ level: supervision     Goal #2 Patient is able to demonstrate transfers EOB to/from Chair/Wheelchair at assistance level: modified independent     Goal #3 Patient is able to ambulate 200 feet with a • Subluxation of metatarsophalangeal joint of lesser toe 9/9/2014   • Vitamin D deficiency        Past Surgical History  Past Surgical History:   Procedure Laterality Date   • CATARACT Bilateral    • COLONOSCOPY, POSSIBLE BIOPSY, POSSIBLE POLYPECTOMY 58358 her legs with mod A. Able to maintain neutral neck position in midline. SBA to stand, CGA for balance while pt pulled pants up to waist, min A to raise pants up. Once seated again, used sock aid to jonel socks on with min A.   Supervision to ambulate to b simplification techniques;ADL training;IADL training;Continued evaluation; Compensatory technique education; Neuromuscluar reeducation;Equipment eval/education;Patient/Family training;Patient/Family education; Endurance training;UE strengthening/ROM; Functiona craniectomy for chiari malformation decompression with duroplasty with grafting on 3/16/20.       Problem List  Active Problems:    Chiari I malformation [facility])      Past Medical History  Past Medical History:   Diagnosis Date   • Cataract    • Chronic rhini WEIGHT BEARING RESTRICTION  Weight Bearing Restriction: None                PAIN ASSESSMENT  Rating: 3  Location: HA  Management Techniques: Activity promotion; Body mechanics;Breathing techniques;Relaxation;Repositioning    COGNITION  Overall Cognitive Sta Standardized Score (AM-PAC Scale): 37.26  CMS Modifier (G-Code): CK    FUNCTIONAL TRANSFER ASSESSMENT  Supine to Sit : Minimum assistance  Sit to Stand: Minimum assistance    Skilled Therapy Provided: Pt was received supine in bed for session, pt t/f to EO mod modifications of tasks    Clinical Decision Making MODERATE - Analysis of occupational profile, detailed assessments, several treatment options    Overall Complexity MODERATE     OT Discharge Recommendations: Acute rehabilitation       PLAN  OT Treatme 4/30/2020 2:00 PM Stephen Talley MD [facility], [address]      Multidisciplinary Problems     Active Goals        Problem: Patient/Family Goals    Goal Priority Disciplines Outcome Interventions   Patient/Family L

## (undated) NOTE — LETTER
20        RE: Sara Carballo     : 1960    Dear Dr. Robert Morton,    This letter is to inform you that your patient is being scheduled for surgery with Dr. Cortney Levine on 20 at [facility]. We have asked the patient to contact your office t